# Patient Record
Sex: FEMALE | Race: BLACK OR AFRICAN AMERICAN | Employment: FULL TIME | URBAN - METROPOLITAN AREA
[De-identification: names, ages, dates, MRNs, and addresses within clinical notes are randomized per-mention and may not be internally consistent; named-entity substitution may affect disease eponyms.]

---

## 2018-04-15 ENCOUNTER — HOSPITAL ENCOUNTER (EMERGENCY)
Age: 38
Discharge: HOME | End: 2018-04-15
Payer: COMMERCIAL

## 2018-04-15 VITALS
SYSTOLIC BLOOD PRESSURE: 115 MMHG | HEART RATE: 84 BPM | BODY MASS INDEX: 30.24 KG/M2 | OXYGEN SATURATION: 100 % | HEIGHT: 59 IN | WEIGHT: 150 LBS | DIASTOLIC BLOOD PRESSURE: 72 MMHG

## 2018-04-15 DIAGNOSIS — S00.511A LIP ABRASION, INITIAL ENCOUNTER: Primary | ICD-10-CM

## 2018-04-15 DIAGNOSIS — S01.512A LACERATION OF ORAL CAVITY, INITIAL ENCOUNTER: ICD-10-CM

## 2018-04-15 PROCEDURE — 99282 EMERGENCY DEPT VISIT SF MDM: CPT

## 2018-04-15 RX ORDER — IBUPROFEN 600 MG/1
600 TABLET ORAL ONCE
Status: DISCONTINUED | OUTPATIENT
Start: 2018-04-15 | End: 2018-04-16 | Stop reason: HOSPADM

## 2018-04-15 ASSESSMENT — PAIN DESCRIPTION - PAIN TYPE: TYPE: ACUTE PAIN

## 2018-04-15 ASSESSMENT — PAIN DESCRIPTION - LOCATION: LOCATION: MOUTH

## 2018-04-15 ASSESSMENT — PAIN SCALES - GENERAL: PAINLEVEL_OUTOF10: 10

## 2018-07-02 ENCOUNTER — APPOINTMENT (OUTPATIENT)
Dept: CT IMAGING | Age: 38
End: 2018-07-02
Payer: COMMERCIAL

## 2018-07-02 ENCOUNTER — HOSPITAL ENCOUNTER (EMERGENCY)
Age: 38
Discharge: HOME | End: 2018-07-02
Payer: COMMERCIAL

## 2018-07-02 VITALS
BODY MASS INDEX: 30.24 KG/M2 | DIASTOLIC BLOOD PRESSURE: 86 MMHG | TEMPERATURE: 97.5 F | OXYGEN SATURATION: 99 % | HEART RATE: 74 BPM | HEIGHT: 59 IN | RESPIRATION RATE: 14 BRPM | WEIGHT: 150 LBS | SYSTOLIC BLOOD PRESSURE: 122 MMHG
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
## 2018-07-02 DIAGNOSIS — S16.1XXA STRAIN OF NECK MUSCLE, INITIAL ENCOUNTER: Primary | ICD-10-CM

## 2018-07-02 PROCEDURE — 99284 EMERGENCY DEPT VISIT MOD MDM: CPT

## 2018-07-02 PROCEDURE — 6370000000 HC RX 637 (ALT 250 FOR IP): Performed by: NURSE PRACTITIONER

## 2018-07-02 PROCEDURE — 72125 CT NECK SPINE W/O DYE: CPT

## 2018-07-02 RX ORDER — IBUPROFEN 800 MG/1
800 TABLET ORAL EVERY 6 HOURS PRN
Qty: 21 TABLET | Refills: 0 | Status: SHIPPED | OUTPATIENT
Start: 2018-07-02 | End: 2018-11-19 | Stop reason: ALTCHOICE

## 2018-07-02 RX ORDER — IBUPROFEN 800 MG/1
800 TABLET ORAL ONCE
Status: COMPLETED | OUTPATIENT
Start: 2018-07-02 | End: 2018-07-02

## 2018-07-02 RX ORDER — METHOCARBAMOL 500 MG/1
500 TABLET, FILM COATED ORAL 4 TIMES DAILY
Qty: 20 TABLET | Refills: 0 | Status: SHIPPED | OUTPATIENT
Start: 2018-07-02 | End: 2018-07-07

## 2018-07-02 RX ADMIN — IBUPROFEN 800 MG: 800 TABLET ORAL at 13:25

## 2018-07-02 ASSESSMENT — PAIN DESCRIPTION - PAIN TYPE: TYPE: ACUTE PAIN

## 2018-07-02 ASSESSMENT — PAIN SCALES - GENERAL
PAINLEVEL_OUTOF10: 9
PAINLEVEL_OUTOF10: 9

## 2018-07-02 ASSESSMENT — PAIN DESCRIPTION - FREQUENCY: FREQUENCY: CONTINUOUS

## 2018-07-02 ASSESSMENT — PAIN DESCRIPTION - ORIENTATION: ORIENTATION: POSTERIOR

## 2018-07-02 ASSESSMENT — PAIN DESCRIPTION - LOCATION: LOCATION: NECK

## 2018-07-02 ASSESSMENT — PAIN DESCRIPTION - DESCRIPTORS: DESCRIPTORS: ACHING;BURNING
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
## 2018-11-19 ENCOUNTER — HOSPITAL ENCOUNTER (EMERGENCY)
Age: 38
Discharge: HOME | End: 2018-11-19
Attending: EMERGENCY MEDICINE
Payer: COMMERCIAL

## 2018-11-19 VITALS
WEIGHT: 154 LBS | SYSTOLIC BLOOD PRESSURE: 116 MMHG | TEMPERATURE: 97.8 F | HEART RATE: 79 BPM | RESPIRATION RATE: 20 BRPM | DIASTOLIC BLOOD PRESSURE: 78 MMHG | BODY MASS INDEX: 31.04 KG/M2 | HEIGHT: 59 IN | OXYGEN SATURATION: 100 %

## 2018-11-19 DIAGNOSIS — N89.8 VAGINAL DISCHARGE: Primary | ICD-10-CM

## 2018-11-19 LAB
BILIRUBIN URINE: NEGATIVE
BLOOD, URINE: NEGATIVE
CLARITY: CLEAR
CLUE CELLS: NORMAL
COLOR: YELLOW
GLUCOSE URINE: NEGATIVE MG/DL
HCG(URINE) PREGNANCY TEST: NEGATIVE
KETONES, URINE: NEGATIVE MG/DL
LEUKOCYTE ESTERASE, URINE: NEGATIVE
NITRITE, URINE: NEGATIVE
PH UA: 5.5 (ref 5–9)
PROTEIN UA: NEGATIVE MG/DL
SOURCE WET PREP: NORMAL
SPECIFIC GRAVITY UA: 1.02 (ref 1–1.03)
TRICHOMONAS PREP: NORMAL
UROBILINOGEN, URINE: 0.2 E.U./DL
YEAST WET PREP: NORMAL

## 2018-11-19 PROCEDURE — 81025 URINE PREGNANCY TEST: CPT

## 2018-11-19 PROCEDURE — 6360000002 HC RX W HCPCS: Performed by: EMERGENCY MEDICINE

## 2018-11-19 PROCEDURE — 6370000000 HC RX 637 (ALT 250 FOR IP): Performed by: EMERGENCY MEDICINE

## 2018-11-19 PROCEDURE — 81003 URINALYSIS AUTO W/O SCOPE: CPT

## 2018-11-19 PROCEDURE — 96372 THER/PROPH/DIAG INJ SC/IM: CPT

## 2018-11-19 PROCEDURE — 87591 N.GONORRHOEAE DNA AMP PROB: CPT

## 2018-11-19 PROCEDURE — 87491 CHLMYD TRACH DNA AMP PROBE: CPT

## 2018-11-19 PROCEDURE — 87210 SMEAR WET MOUNT SALINE/INK: CPT

## 2018-11-19 PROCEDURE — 99283 EMERGENCY DEPT VISIT LOW MDM: CPT

## 2018-11-19 PROCEDURE — 2500000003 HC RX 250 WO HCPCS

## 2018-11-19 RX ORDER — AMOXICILLIN 500 MG/1
500 CAPSULE ORAL 3 TIMES DAILY
COMMUNITY
End: 2019-01-22

## 2018-11-19 RX ORDER — AZITHROMYCIN 250 MG/1
1000 TABLET, FILM COATED ORAL ONCE
Status: COMPLETED | OUTPATIENT
Start: 2018-11-19 | End: 2018-11-19

## 2018-11-19 RX ORDER — LIDOCAINE HYDROCHLORIDE 10 MG/ML
INJECTION, SOLUTION EPIDURAL; INFILTRATION; INTRACAUDAL; PERINEURAL
Status: COMPLETED
Start: 2018-11-19 | End: 2018-11-19

## 2018-11-19 RX ORDER — CEFTRIAXONE SODIUM 250 MG/1
250 INJECTION, POWDER, FOR SOLUTION INTRAMUSCULAR; INTRAVENOUS ONCE
Status: COMPLETED | OUTPATIENT
Start: 2018-11-19 | End: 2018-11-19

## 2018-11-19 RX ADMIN — CEFTRIAXONE SODIUM 250 MG: 250 INJECTION, POWDER, FOR SOLUTION INTRAMUSCULAR; INTRAVENOUS at 12:06

## 2018-11-19 RX ADMIN — AZITHROMYCIN 1000 MG: 250 TABLET, FILM COATED ORAL at 12:06

## 2018-11-19 RX ADMIN — LIDOCAINE HYDROCHLORIDE 5 ML: 10 INJECTION, SOLUTION EPIDURAL; INFILTRATION; INTRACAUDAL; PERINEURAL at 12:06

## 2018-11-19 NOTE — ED NOTES
Patient given discharge paperwork at this time. Patient also given scripts. Patient has no further questions at this time. Nurse provided education to patient. Patient is alert and oriented and VS are stable at this time.         Elizabeth Clement RN  11/19/18 1428

## 2018-11-19 NOTE — ED PROVIDER NOTES
Negative    pH, UA 5.5 5.0 - 9.0    Protein, UA Negative Negative mg/dL    Urobilinogen, Urine 0.2 <2.0 E.U./dL    Nitrite, Urine Negative Negative    Leukocyte Esterase, Urine Negative Negative     No orders to display           All above test results were reviewed in details. ------------------------- NURSING NOTES AND VITALS REVIEWED ---------------------------   The nursing notes within the ED encounter and vital signs as below have been reviewed. /64   Pulse 66   Temp 97.8 °F (36.6 °C) (Oral)   Resp 16   Ht 4' 11\" (1.499 m)   Wt 154 lb (69.9 kg)   LMP 11/12/2018   SpO2 100%   BMI 31.10 kg/m²   Oxygen Saturation Interpretation: Normal      ------------------------------------------ PROGRESS NOTES ------------------------------------------       I have spoken with the patient and discussed todays results, in addition to providing specific details for the plan of care and counseling regarding the diagnosis and prognosis. Their questions are answered at this time and they are agreeable with the plan. Medical Decision Making Rationale: This patient presented emergency room with vaginal discharge and was foul-smelling. The patient was placed on Flagyl and amoxicillin without improvement of symptoms. No cultures were obtained initially. Pelvic exam was negative for cervical motion tenderness. --------------------------------- ADDITIONAL PROVIDER NOTES ---------------------------------       This patient is stable for discharge. I have shared the specific conditions for return, as well as the importance of follow-up. Clinical Impression:  1.  Vaginal discharge            Michele Britt MD  11/19/18 5929

## 2018-11-23 LAB
CHLAMYDIA TRACHOMATIS AMPLIFIED DET: NORMAL
N GONORRHOEAE AMPLIFIED DET: NORMAL

## 2019-01-22 ENCOUNTER — HOSPITAL ENCOUNTER (EMERGENCY)
Age: 39
Discharge: HOME | End: 2019-01-22
Attending: EMERGENCY MEDICINE
Payer: COMMERCIAL

## 2019-01-22 ENCOUNTER — APPOINTMENT (OUTPATIENT)
Dept: CT IMAGING | Age: 39
End: 2019-01-22
Payer: COMMERCIAL

## 2019-01-22 VITALS
DIASTOLIC BLOOD PRESSURE: 64 MMHG | TEMPERATURE: 98 F | RESPIRATION RATE: 18 BRPM | SYSTOLIC BLOOD PRESSURE: 105 MMHG | OXYGEN SATURATION: 98 % | HEART RATE: 70 BPM | WEIGHT: 154 LBS | BODY MASS INDEX: 31.04 KG/M2 | HEIGHT: 59 IN

## 2019-01-22 DIAGNOSIS — K59.00 CONSTIPATION, UNSPECIFIED CONSTIPATION TYPE: ICD-10-CM

## 2019-01-22 DIAGNOSIS — R10.9 LEFT FLANK PAIN: Primary | ICD-10-CM

## 2019-01-22 LAB
ALBUMIN SERPL-MCNC: 3.9 G/DL (ref 3.5–5.2)
ALP BLD-CCNC: 54 U/L (ref 35–104)
ALT SERPL-CCNC: 17 U/L (ref 0–32)
ANION GAP SERPL CALCULATED.3IONS-SCNC: 10 MMOL/L (ref 7–16)
AST SERPL-CCNC: 28 U/L (ref 0–31)
BASOPHILS ABSOLUTE: 0.02 E9/L (ref 0–0.2)
BASOPHILS RELATIVE PERCENT: 0.2 % (ref 0–2)
BILIRUB SERPL-MCNC: <0.2 MG/DL (ref 0–1.2)
BILIRUBIN URINE: NEGATIVE
BLOOD, URINE: NEGATIVE
BUN BLDV-MCNC: 16 MG/DL (ref 6–20)
CALCIUM SERPL-MCNC: 8.8 MG/DL (ref 8.6–10.2)
CHLORIDE BLD-SCNC: 103 MMOL/L (ref 98–107)
CLARITY: CLEAR
CO2: 21 MMOL/L (ref 22–29)
COLOR: YELLOW
CREAT SERPL-MCNC: 0.9 MG/DL (ref 0.5–1)
EOSINOPHILS ABSOLUTE: 0.03 E9/L (ref 0.05–0.5)
EOSINOPHILS RELATIVE PERCENT: 0.3 % (ref 0–6)
GFR AFRICAN AMERICAN: >60
GFR NON-AFRICAN AMERICAN: >60 ML/MIN/1.73
GLUCOSE BLD-MCNC: 85 MG/DL (ref 74–99)
GLUCOSE URINE: NEGATIVE MG/DL
HCG, URINE, POC: NEGATIVE
HCT VFR BLD CALC: 35.2 % (ref 34–48)
HEMOGLOBIN: 11.9 G/DL (ref 11.5–15.5)
IMMATURE GRANULOCYTES #: 0.04 E9/L
IMMATURE GRANULOCYTES %: 0.4 % (ref 0–5)
KETONES, URINE: ABNORMAL MG/DL
LACTIC ACID: 0.8 MMOL/L (ref 0.5–2.2)
LEUKOCYTE ESTERASE, URINE: NEGATIVE
LIPASE: 48 U/L (ref 13–60)
LYMPHOCYTES ABSOLUTE: 3.57 E9/L (ref 1.5–4)
LYMPHOCYTES RELATIVE PERCENT: 37.8 % (ref 20–42)
Lab: NORMAL
MCH RBC QN AUTO: 30.7 PG (ref 26–35)
MCHC RBC AUTO-ENTMCNC: 33.8 % (ref 32–34.5)
MCV RBC AUTO: 90.7 FL (ref 80–99.9)
MONOCYTES ABSOLUTE: 0.66 E9/L (ref 0.1–0.95)
MONOCYTES RELATIVE PERCENT: 7 % (ref 2–12)
NEGATIVE QC PASS/FAIL: NORMAL
NEUTROPHILS ABSOLUTE: 5.12 E9/L (ref 1.8–7.3)
NEUTROPHILS RELATIVE PERCENT: 54.3 % (ref 43–80)
NITRITE, URINE: NEGATIVE
PDW BLD-RTO: 12.8 FL (ref 11.5–15)
PH UA: 6 (ref 5–9)
PLATELET # BLD: 239 E9/L (ref 130–450)
PMV BLD AUTO: 10.7 FL (ref 7–12)
POSITIVE QC PASS/FAIL: NORMAL
POTASSIUM SERPL-SCNC: 4.5 MMOL/L (ref 3.5–5)
PROTEIN UA: NEGATIVE MG/DL
RBC # BLD: 3.88 E12/L (ref 3.5–5.5)
SODIUM BLD-SCNC: 134 MMOL/L (ref 132–146)
SPECIFIC GRAVITY UA: 1.02 (ref 1–1.03)
TOTAL PROTEIN: 7.7 G/DL (ref 6.4–8.3)
UROBILINOGEN, URINE: 0.2 E.U./DL
WBC # BLD: 9.4 E9/L (ref 4.5–11.5)

## 2019-01-22 PROCEDURE — 2580000003 HC RX 258: Performed by: RADIOLOGY

## 2019-01-22 PROCEDURE — 99284 EMERGENCY DEPT VISIT MOD MDM: CPT

## 2019-01-22 PROCEDURE — 81003 URINALYSIS AUTO W/O SCOPE: CPT

## 2019-01-22 PROCEDURE — 96374 THER/PROPH/DIAG INJ IV PUSH: CPT

## 2019-01-22 PROCEDURE — 80053 COMPREHEN METABOLIC PANEL: CPT

## 2019-01-22 PROCEDURE — 85025 COMPLETE CBC W/AUTO DIFF WBC: CPT

## 2019-01-22 PROCEDURE — 83690 ASSAY OF LIPASE: CPT

## 2019-01-22 PROCEDURE — 83605 ASSAY OF LACTIC ACID: CPT

## 2019-01-22 PROCEDURE — 2580000003 HC RX 258: Performed by: EMERGENCY MEDICINE

## 2019-01-22 PROCEDURE — 6360000002 HC RX W HCPCS: Performed by: EMERGENCY MEDICINE

## 2019-01-22 PROCEDURE — 96375 TX/PRO/DX INJ NEW DRUG ADDON: CPT

## 2019-01-22 PROCEDURE — 74177 CT ABD & PELVIS W/CONTRAST: CPT

## 2019-01-22 PROCEDURE — 6360000004 HC RX CONTRAST MEDICATION: Performed by: RADIOLOGY

## 2019-01-22 RX ORDER — DOCUSATE SODIUM 100 MG/1
100 CAPSULE, LIQUID FILLED ORAL 2 TIMES DAILY
Qty: 28 CAPSULE | Refills: 0 | Status: SHIPPED | OUTPATIENT
Start: 2019-01-22 | End: 2019-02-05

## 2019-01-22 RX ORDER — SODIUM CHLORIDE 0.9 % (FLUSH) 0.9 %
10 SYRINGE (ML) INJECTION 2 TIMES DAILY
Status: DISCONTINUED | OUTPATIENT
Start: 2019-01-22 | End: 2019-01-22

## 2019-01-22 RX ORDER — ONDANSETRON 2 MG/ML
4 INJECTION INTRAMUSCULAR; INTRAVENOUS ONCE
Status: COMPLETED | OUTPATIENT
Start: 2019-01-22 | End: 2019-01-22

## 2019-01-22 RX ORDER — KETOROLAC TROMETHAMINE 30 MG/ML
15 INJECTION, SOLUTION INTRAMUSCULAR; INTRAVENOUS ONCE
Status: COMPLETED | OUTPATIENT
Start: 2019-01-22 | End: 2019-01-22

## 2019-01-22 RX ORDER — POLYETHYLENE GLYCOL 3350 17 G/17G
POWDER, FOR SOLUTION ORAL
Qty: 1 BOTTLE | Refills: 0 | Status: SHIPPED | OUTPATIENT
Start: 2019-01-22 | End: 2019-02-05

## 2019-01-22 RX ORDER — 0.9 % SODIUM CHLORIDE 0.9 %
1000 INTRAVENOUS SOLUTION INTRAVENOUS ONCE
Status: COMPLETED | OUTPATIENT
Start: 2019-01-22 | End: 2019-01-22

## 2019-01-22 RX ORDER — SODIUM CHLORIDE 0.9 % (FLUSH) 0.9 %
10 SYRINGE (ML) INJECTION 2 TIMES DAILY
Status: DISCONTINUED | OUTPATIENT
Start: 2019-01-22 | End: 2019-01-22 | Stop reason: HOSPADM

## 2019-01-22 RX ADMIN — IOPAMIDOL 110 ML: 755 INJECTION, SOLUTION INTRAVENOUS at 05:56

## 2019-01-22 RX ADMIN — Medication 10 ML: at 05:52

## 2019-01-22 RX ADMIN — ONDANSETRON 4 MG: 2 INJECTION INTRAMUSCULAR; INTRAVENOUS at 04:36

## 2019-01-22 RX ADMIN — KETOROLAC TROMETHAMINE 15 MG: 30 INJECTION, SOLUTION INTRAMUSCULAR; INTRAVENOUS at 04:36

## 2019-01-22 RX ADMIN — SODIUM CHLORIDE 1000 ML: 9 INJECTION, SOLUTION INTRAVENOUS at 04:36

## 2019-01-22 ASSESSMENT — PAIN DESCRIPTION - ORIENTATION: ORIENTATION: LEFT

## 2019-01-22 ASSESSMENT — PAIN SCALES - GENERAL
PAINLEVEL_OUTOF10: 5
PAINLEVEL_OUTOF10: 8
PAINLEVEL_OUTOF10: 8
PAINLEVEL_OUTOF10: 6

## 2019-01-22 ASSESSMENT — PAIN DESCRIPTION - PAIN TYPE: TYPE: ACUTE PAIN

## 2019-01-22 ASSESSMENT — PAIN DESCRIPTION - LOCATION
LOCATION: ABDOMEN
LOCATION: ABDOMEN

## 2019-03-07 PROBLEM — B20 HIV (HUMAN IMMUNODEFICIENCY VIRUS INFECTION) (HCC): Status: ACTIVE | Noted: 2019-03-07

## 2019-03-07 PROBLEM — Z21 HIV (HUMAN IMMUNODEFICIENCY VIRUS INFECTION) (HCC): Status: ACTIVE | Noted: 2019-03-07

## 2019-05-22 ENCOUNTER — HOSPITAL ENCOUNTER (EMERGENCY)
Age: 39
Discharge: HOME | End: 2019-05-22
Payer: COMMERCIAL

## 2019-05-22 ENCOUNTER — APPOINTMENT (OUTPATIENT)
Dept: GENERAL RADIOLOGY | Age: 39
End: 2019-05-22
Payer: COMMERCIAL

## 2019-05-22 ENCOUNTER — APPOINTMENT (OUTPATIENT)
Dept: ULTRASOUND IMAGING | Age: 39
End: 2019-05-22
Payer: COMMERCIAL

## 2019-05-22 VITALS
HEART RATE: 60 BPM | HEIGHT: 59 IN | TEMPERATURE: 98.4 F | SYSTOLIC BLOOD PRESSURE: 101 MMHG | DIASTOLIC BLOOD PRESSURE: 63 MMHG | BODY MASS INDEX: 32.25 KG/M2 | OXYGEN SATURATION: 98 % | RESPIRATION RATE: 16 BRPM | WEIGHT: 160 LBS

## 2019-05-22 DIAGNOSIS — M25.551 PAIN IN JOINT INVOLVING RIGHT PELVIC REGION AND THIGH: ICD-10-CM

## 2019-05-22 DIAGNOSIS — N83.202 CYST OF LEFT OVARY: ICD-10-CM

## 2019-05-22 DIAGNOSIS — M25.551 RIGHT HIP PAIN: Primary | ICD-10-CM

## 2019-05-22 LAB
BACTERIA: ABNORMAL /HPF
BILIRUBIN URINE: NEGATIVE
BLOOD, URINE: NEGATIVE
CLARITY: NORMAL
COLOR: YELLOW
EPITHELIAL CELLS, UA: ABNORMAL /HPF
GLUCOSE URINE: NEGATIVE MG/DL
HCG, URINE, POC: NEGATIVE
KETONES, URINE: NEGATIVE MG/DL
LEUKOCYTE ESTERASE, URINE: NEGATIVE
Lab: NORMAL
NEGATIVE QC PASS/FAIL: NORMAL
NITRITE, URINE: NEGATIVE
PH UA: 6.5 (ref 5–9)
POSITIVE QC PASS/FAIL: NORMAL
PROTEIN UA: NEGATIVE MG/DL
RBC UA: ABNORMAL /HPF (ref 0–2)
SPECIFIC GRAVITY UA: 1.02 (ref 1–1.03)
UROBILINOGEN, URINE: 0.2 E.U./DL
WBC UA: ABNORMAL /HPF (ref 0–5)

## 2019-05-22 PROCEDURE — 76830 TRANSVAGINAL US NON-OB: CPT

## 2019-05-22 PROCEDURE — 81001 URINALYSIS AUTO W/SCOPE: CPT

## 2019-05-22 PROCEDURE — 99284 EMERGENCY DEPT VISIT MOD MDM: CPT

## 2019-05-22 PROCEDURE — 73502 X-RAY EXAM HIP UNI 2-3 VIEWS: CPT

## 2019-05-22 RX ORDER — OXYCODONE HYDROCHLORIDE AND ACETAMINOPHEN 5; 325 MG/1; MG/1
1 TABLET ORAL EVERY 6 HOURS PRN
Qty: 10 TABLET | Refills: 0 | Status: SHIPPED | OUTPATIENT
Start: 2019-05-22 | End: 2019-05-25

## 2019-05-22 RX ORDER — CYCLOBENZAPRINE HCL 5 MG
5 TABLET ORAL 2 TIMES DAILY PRN
Qty: 15 TABLET | Refills: 0 | Status: SHIPPED | OUTPATIENT
Start: 2019-05-22 | End: 2019-06-01

## 2019-05-22 RX ORDER — NAPROXEN 500 MG/1
500 TABLET ORAL 2 TIMES DAILY
Qty: 14 TABLET | Refills: 0 | Status: SHIPPED | OUTPATIENT
Start: 2019-05-22 | End: 2019-05-29

## 2019-05-22 ASSESSMENT — PAIN SCALES - GENERAL: PAINLEVEL_OUTOF10: 8

## 2019-05-22 ASSESSMENT — PAIN DESCRIPTION - LOCATION: LOCATION: HIP;GROIN

## 2019-05-22 ASSESSMENT — PAIN DESCRIPTION - PAIN TYPE: TYPE: ACUTE PAIN

## 2019-05-22 ASSESSMENT — PAIN DESCRIPTION - ORIENTATION: ORIENTATION: RIGHT

## 2019-05-22 ASSESSMENT — PAIN DESCRIPTION - DESCRIPTORS: DESCRIPTORS: ACHING

## 2019-05-22 ASSESSMENT — PAIN DESCRIPTION - FREQUENCY: FREQUENCY: CONTINUOUS
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
## 2019-09-30 ENCOUNTER — APPOINTMENT (OUTPATIENT)
Dept: GENERAL RADIOLOGY | Age: 39
End: 2019-09-30
Payer: COMMERCIAL

## 2019-09-30 ENCOUNTER — HOSPITAL ENCOUNTER (EMERGENCY)
Age: 39
Discharge: HOME | End: 2019-09-30
Payer: COMMERCIAL

## 2019-09-30 VITALS
WEIGHT: 150 LBS | HEIGHT: 59 IN | OXYGEN SATURATION: 99 % | DIASTOLIC BLOOD PRESSURE: 66 MMHG | RESPIRATION RATE: 16 BRPM | HEART RATE: 76 BPM | SYSTOLIC BLOOD PRESSURE: 110 MMHG | TEMPERATURE: 98 F | BODY MASS INDEX: 30.24 KG/M2

## 2019-09-30 DIAGNOSIS — R05.9 COUGH: Primary | ICD-10-CM

## 2019-09-30 LAB
ALBUMIN SERPL-MCNC: 4.1 G/DL (ref 3.5–5.2)
ALP BLD-CCNC: 60 U/L (ref 35–104)
ALT SERPL-CCNC: 7 U/L (ref 0–32)
ANION GAP SERPL CALCULATED.3IONS-SCNC: 13 MMOL/L (ref 7–16)
AST SERPL-CCNC: 23 U/L (ref 0–31)
BASOPHILS ABSOLUTE: 0.02 E9/L (ref 0–0.2)
BASOPHILS RELATIVE PERCENT: 0.3 % (ref 0–2)
BILIRUB SERPL-MCNC: <0.2 MG/DL (ref 0–1.2)
BUN BLDV-MCNC: 12 MG/DL (ref 6–20)
CALCIUM SERPL-MCNC: 9.3 MG/DL (ref 8.6–10.2)
CHLORIDE BLD-SCNC: 106 MMOL/L (ref 98–107)
CO2: 23 MMOL/L (ref 22–29)
CREAT SERPL-MCNC: 1 MG/DL (ref 0.5–1)
EOSINOPHILS ABSOLUTE: 0.02 E9/L (ref 0.05–0.5)
EOSINOPHILS RELATIVE PERCENT: 0.3 % (ref 0–6)
GFR AFRICAN AMERICAN: >60
GFR NON-AFRICAN AMERICAN: >60 ML/MIN/1.73
GLUCOSE BLD-MCNC: 76 MG/DL (ref 74–99)
HCT VFR BLD CALC: 35.3 % (ref 34–48)
HEMOGLOBIN: 12 G/DL (ref 11.5–15.5)
IMMATURE GRANULOCYTES #: 0.01 E9/L
IMMATURE GRANULOCYTES %: 0.2 % (ref 0–5)
INFLUENZA A BY PCR: NOT DETECTED
INFLUENZA B BY PCR: NOT DETECTED
LYMPHOCYTES ABSOLUTE: 2.54 E9/L (ref 1.5–4)
LYMPHOCYTES RELATIVE PERCENT: 43.6 % (ref 20–42)
MCH RBC QN AUTO: 30.6 PG (ref 26–35)
MCHC RBC AUTO-ENTMCNC: 34 % (ref 32–34.5)
MCV RBC AUTO: 90.1 FL (ref 80–99.9)
MONOCYTES ABSOLUTE: 0.54 E9/L (ref 0.1–0.95)
MONOCYTES RELATIVE PERCENT: 9.3 % (ref 2–12)
NEUTROPHILS ABSOLUTE: 2.7 E9/L (ref 1.8–7.3)
NEUTROPHILS RELATIVE PERCENT: 46.3 % (ref 43–80)
PDW BLD-RTO: 12.9 FL (ref 11.5–15)
PLATELET # BLD: 260 E9/L (ref 130–450)
PMV BLD AUTO: 10.2 FL (ref 7–12)
POTASSIUM REFLEX MAGNESIUM: 3.6 MMOL/L (ref 3.5–5)
RBC # BLD: 3.92 E12/L (ref 3.5–5.5)
SODIUM BLD-SCNC: 142 MMOL/L (ref 132–146)
TOTAL PROTEIN: 7.4 G/DL (ref 6.4–8.3)
WBC # BLD: 5.8 E9/L (ref 4.5–11.5)

## 2019-09-30 PROCEDURE — 85025 COMPLETE CBC W/AUTO DIFF WBC: CPT

## 2019-09-30 PROCEDURE — 80053 COMPREHEN METABOLIC PANEL: CPT

## 2019-09-30 PROCEDURE — 87502 INFLUENZA DNA AMP PROBE: CPT

## 2019-09-30 PROCEDURE — 36415 COLL VENOUS BLD VENIPUNCTURE: CPT

## 2019-09-30 PROCEDURE — 6370000000 HC RX 637 (ALT 250 FOR IP): Performed by: NURSE PRACTITIONER

## 2019-09-30 PROCEDURE — 71046 X-RAY EXAM CHEST 2 VIEWS: CPT

## 2019-09-30 PROCEDURE — 99283 EMERGENCY DEPT VISIT LOW MDM: CPT

## 2019-09-30 RX ORDER — IPRATROPIUM BROMIDE AND ALBUTEROL SULFATE 2.5; .5 MG/3ML; MG/3ML
1 SOLUTION RESPIRATORY (INHALATION) ONCE
Status: COMPLETED | OUTPATIENT
Start: 2019-09-30 | End: 2019-09-30

## 2019-09-30 RX ORDER — LORATADINE 10 MG/1
10 TABLET ORAL DAILY
Qty: 30 TABLET | Refills: 0 | Status: SHIPPED | OUTPATIENT
Start: 2019-09-30 | End: 2019-10-30

## 2019-09-30 RX ORDER — GUAIFENESIN AND DEXTROMETHORPHAN HYDROBROMIDE 1200; 60 MG/1; MG/1
1 TABLET, EXTENDED RELEASE ORAL 2 TIMES DAILY
Qty: 20 TABLET | Refills: 0 | Status: SHIPPED | OUTPATIENT
Start: 2019-09-30 | End: 2019-10-10

## 2019-09-30 RX ORDER — PREDNISONE 20 MG/1
60 TABLET ORAL ONCE
Status: COMPLETED | OUTPATIENT
Start: 2019-09-30 | End: 2019-09-30

## 2019-09-30 RX ORDER — FLUTICASONE PROPIONATE 50 MCG
1 SPRAY, SUSPENSION (ML) NASAL DAILY
Qty: 1 BOTTLE | Refills: 0 | Status: SHIPPED | OUTPATIENT
Start: 2019-09-30 | End: 2019-11-16 | Stop reason: ALTCHOICE

## 2019-09-30 RX ADMIN — IPRATROPIUM BROMIDE AND ALBUTEROL SULFATE 1 AMPULE: .5; 3 SOLUTION RESPIRATORY (INHALATION) at 15:53

## 2019-09-30 RX ADMIN — PREDNISONE 60 MG: 20 TABLET ORAL at 15:53

## 2019-10-30 ENCOUNTER — TELEPHONE (OUTPATIENT)
Dept: ENT CLINIC | Age: 39
End: 2019-10-30

## 2019-11-16 ENCOUNTER — APPOINTMENT (OUTPATIENT)
Dept: GENERAL RADIOLOGY | Age: 39
End: 2019-11-16
Payer: COMMERCIAL

## 2019-11-16 ENCOUNTER — HOSPITAL ENCOUNTER (EMERGENCY)
Age: 39
Discharge: HOME | End: 2019-11-17
Attending: EMERGENCY MEDICINE
Payer: COMMERCIAL

## 2019-11-16 DIAGNOSIS — J20.8 ACUTE BRONCHITIS DUE TO OTHER SPECIFIED ORGANISMS: Primary | ICD-10-CM

## 2019-11-16 DIAGNOSIS — R04.2 COUGH WITH HEMOPTYSIS: ICD-10-CM

## 2019-11-16 PROCEDURE — 71046 X-RAY EXAM CHEST 2 VIEWS: CPT

## 2019-11-16 PROCEDURE — 99283 EMERGENCY DEPT VISIT LOW MDM: CPT

## 2019-11-16 PROCEDURE — 6360000002 HC RX W HCPCS: Performed by: EMERGENCY MEDICINE

## 2019-11-16 PROCEDURE — 96372 THER/PROPH/DIAG INJ SC/IM: CPT

## 2019-11-16 PROCEDURE — 6370000000 HC RX 637 (ALT 250 FOR IP): Performed by: EMERGENCY MEDICINE

## 2019-11-16 PROCEDURE — 2580000003 HC RX 258

## 2019-11-16 RX ORDER — SULFAMETHOXAZOLE AND TRIMETHOPRIM 800; 160 MG/1; MG/1
1 TABLET ORAL 2 TIMES DAILY
COMMUNITY
End: 2019-12-08

## 2019-11-16 RX ORDER — GUAIFENESIN/DEXTROMETHORPHAN 100-10MG/5
10 SYRUP ORAL ONCE
Status: COMPLETED | OUTPATIENT
Start: 2019-11-16 | End: 2019-11-16

## 2019-11-16 RX ORDER — DOXYCYCLINE HYCLATE 100 MG
100 TABLET ORAL 2 TIMES DAILY
Qty: 20 TABLET | Refills: 0 | Status: SHIPPED | OUTPATIENT
Start: 2019-11-16 | End: 2019-11-26

## 2019-11-16 RX ORDER — PREDNISONE 20 MG/1
60 TABLET ORAL ONCE
Status: COMPLETED | OUTPATIENT
Start: 2019-11-16 | End: 2019-11-16

## 2019-11-16 RX ORDER — CEFTRIAXONE 1 G/1
1 INJECTION, POWDER, FOR SOLUTION INTRAMUSCULAR; INTRAVENOUS ONCE
Status: COMPLETED | OUTPATIENT
Start: 2019-11-16 | End: 2019-11-16

## 2019-11-16 RX ORDER — LORATADINE AND PSEUDOEPHEDRINE SULFATE 5; 120 MG/1; MG/1
1 TABLET, EXTENDED RELEASE ORAL 2 TIMES DAILY
Qty: 20 TABLET | Refills: 0 | Status: SHIPPED | OUTPATIENT
Start: 2019-11-16 | End: 2019-12-08

## 2019-11-16 RX ORDER — IPRATROPIUM BROMIDE AND ALBUTEROL SULFATE 2.5; .5 MG/3ML; MG/3ML
1 SOLUTION RESPIRATORY (INHALATION) ONCE
Status: COMPLETED | OUTPATIENT
Start: 2019-11-16 | End: 2019-11-16

## 2019-11-16 RX ORDER — BENZONATATE 100 MG/1
100 CAPSULE ORAL 3 TIMES DAILY PRN
Qty: 21 CAPSULE | Refills: 0 | Status: SHIPPED | OUTPATIENT
Start: 2019-11-16 | End: 2019-11-23

## 2019-11-16 RX ORDER — TRIAMCINOLONE ACETONIDE 40 MG/ML
40 INJECTION, SUSPENSION INTRA-ARTICULAR; INTRAMUSCULAR ONCE
Status: DISCONTINUED | OUTPATIENT
Start: 2019-11-16 | End: 2019-11-16

## 2019-11-16 RX ORDER — ALBUTEROL SULFATE 90 UG/1
2 AEROSOL, METERED RESPIRATORY (INHALATION) EVERY 4 HOURS PRN
Qty: 1 INHALER | Status: SHIPPED | OUTPATIENT
Start: 2019-11-16 | End: 2019-12-08

## 2019-11-16 RX ADMIN — IPRATROPIUM BROMIDE AND ALBUTEROL SULFATE 1 AMPULE: .5; 3 SOLUTION RESPIRATORY (INHALATION) at 23:38

## 2019-11-16 RX ADMIN — CEFTRIAXONE SODIUM 1 G: 1 INJECTION, POWDER, FOR SOLUTION INTRAMUSCULAR; INTRAVENOUS at 23:37

## 2019-11-16 RX ADMIN — GUAIFENESIN AND DEXTROMETHORPHAN 10 ML: 100; 10 SYRUP ORAL at 23:37

## 2019-11-16 RX ADMIN — PREDNISONE 60 MG: 20 TABLET ORAL at 23:37

## 2019-11-16 RX ADMIN — WATER 10 ML: 1 INJECTION INTRAMUSCULAR; INTRAVENOUS; SUBCUTANEOUS at 23:37

## 2019-11-16 ASSESSMENT — PAIN SCALES - GENERAL: PAINLEVEL_OUTOF10: 8

## 2019-11-16 ASSESSMENT — PAIN DESCRIPTION - ORIENTATION: ORIENTATION: UPPER

## 2019-11-16 ASSESSMENT — PAIN DESCRIPTION - FREQUENCY: FREQUENCY: INTERMITTENT

## 2019-11-16 ASSESSMENT — PAIN DESCRIPTION - LOCATION: LOCATION: RIB CAGE

## 2019-11-17 VITALS
WEIGHT: 160 LBS | HEART RATE: 72 BPM | HEIGHT: 59 IN | SYSTOLIC BLOOD PRESSURE: 100 MMHG | TEMPERATURE: 98.8 F | DIASTOLIC BLOOD PRESSURE: 64 MMHG | OXYGEN SATURATION: 96 % | RESPIRATION RATE: 14 BRPM | BODY MASS INDEX: 32.25 KG/M2

## 2019-12-08 ENCOUNTER — HOSPITAL ENCOUNTER (EMERGENCY)
Age: 39
Discharge: HOME | End: 2019-12-08
Attending: EMERGENCY MEDICINE
Payer: COMMERCIAL

## 2019-12-08 VITALS
TEMPERATURE: 98.8 F | HEART RATE: 72 BPM | HEIGHT: 59 IN | RESPIRATION RATE: 16 BRPM | SYSTOLIC BLOOD PRESSURE: 112 MMHG | DIASTOLIC BLOOD PRESSURE: 90 MMHG | WEIGHT: 160 LBS | OXYGEN SATURATION: 97 % | BODY MASS INDEX: 32.25 KG/M2

## 2019-12-08 DIAGNOSIS — J04.0 LARYNGITIS: ICD-10-CM

## 2019-12-08 DIAGNOSIS — J06.9 VIRAL UPPER RESPIRATORY TRACT INFECTION: Primary | ICD-10-CM

## 2019-12-08 LAB — STREP GRP A PCR: NEGATIVE

## 2019-12-08 PROCEDURE — 99283 EMERGENCY DEPT VISIT LOW MDM: CPT

## 2019-12-08 PROCEDURE — 87880 STREP A ASSAY W/OPTIC: CPT

## 2019-12-08 RX ORDER — ALBUTEROL SULFATE 90 UG/1
2 AEROSOL, METERED RESPIRATORY (INHALATION) EVERY 6 HOURS PRN
Qty: 1 INHALER | Refills: 0 | Status: SHIPPED | OUTPATIENT
Start: 2019-12-08 | End: 2021-04-05

## 2019-12-08 RX ORDER — BENZONATATE 100 MG/1
100 CAPSULE ORAL 3 TIMES DAILY PRN
Qty: 15 CAPSULE | Refills: 0 | Status: SHIPPED | OUTPATIENT
Start: 2019-12-08 | End: 2019-12-13

## 2019-12-08 ASSESSMENT — PAIN DESCRIPTION - PROGRESSION: CLINICAL_PROGRESSION: GRADUALLY WORSENING

## 2019-12-08 ASSESSMENT — PAIN - FUNCTIONAL ASSESSMENT: PAIN_FUNCTIONAL_ASSESSMENT: PREVENTS OR INTERFERES SOME ACTIVE ACTIVITIES AND ADLS

## 2019-12-08 ASSESSMENT — PAIN DESCRIPTION - DESCRIPTORS: DESCRIPTORS: SORE

## 2019-12-08 ASSESSMENT — PAIN DESCRIPTION - LOCATION: LOCATION: THROAT

## 2019-12-08 ASSESSMENT — PAIN DESCRIPTION - ONSET: ONSET: ON-GOING

## 2019-12-08 ASSESSMENT — PAIN SCALES - GENERAL: PAINLEVEL_OUTOF10: 8

## 2019-12-08 ASSESSMENT — PAIN DESCRIPTION - PAIN TYPE: TYPE: ACUTE PAIN

## 2019-12-08 ASSESSMENT — PAIN DESCRIPTION - FREQUENCY: FREQUENCY: CONTINUOUS

## 2020-01-31 ENCOUNTER — HOSPITAL ENCOUNTER (EMERGENCY)
Age: 40
Discharge: HOME | End: 2020-01-31
Attending: EMERGENCY MEDICINE
Payer: COMMERCIAL

## 2020-01-31 ENCOUNTER — APPOINTMENT (OUTPATIENT)
Dept: GENERAL RADIOLOGY | Age: 40
End: 2020-01-31
Payer: COMMERCIAL

## 2020-01-31 ENCOUNTER — APPOINTMENT (OUTPATIENT)
Dept: ULTRASOUND IMAGING | Age: 40
End: 2020-01-31
Payer: COMMERCIAL

## 2020-01-31 VITALS
HEART RATE: 78 BPM | DIASTOLIC BLOOD PRESSURE: 78 MMHG | HEIGHT: 59 IN | TEMPERATURE: 98.4 F | WEIGHT: 155 LBS | BODY MASS INDEX: 31.25 KG/M2 | RESPIRATION RATE: 18 BRPM | OXYGEN SATURATION: 98 % | SYSTOLIC BLOOD PRESSURE: 120 MMHG

## 2020-01-31 LAB — D DIMER: >5250 NG/ML DDU

## 2020-01-31 PROCEDURE — 85378 FIBRIN DEGRADE SEMIQUANT: CPT

## 2020-01-31 PROCEDURE — 73080 X-RAY EXAM OF ELBOW: CPT

## 2020-01-31 PROCEDURE — 6360000002 HC RX W HCPCS: Performed by: EMERGENCY MEDICINE

## 2020-01-31 PROCEDURE — 99284 EMERGENCY DEPT VISIT MOD MDM: CPT

## 2020-01-31 PROCEDURE — 96372 THER/PROPH/DIAG INJ SC/IM: CPT

## 2020-01-31 PROCEDURE — 93971 EXTREMITY STUDY: CPT

## 2020-01-31 RX ORDER — IBUPROFEN 800 MG/1
800 TABLET ORAL EVERY 6 HOURS PRN
COMMUNITY
End: 2020-01-31

## 2020-01-31 RX ORDER — DEXAMETHASONE SODIUM PHOSPHATE 10 MG/ML
10 INJECTION INTRAMUSCULAR; INTRAVENOUS ONCE
Status: DISCONTINUED | OUTPATIENT
Start: 2020-01-31 | End: 2020-01-31 | Stop reason: HOSPADM

## 2020-01-31 RX ORDER — IBUPROFEN 400 MG/1
400 TABLET ORAL EVERY 6 HOURS PRN
Qty: 40 TABLET | Refills: 0 | Status: SHIPPED | OUTPATIENT
Start: 2020-01-31 | End: 2020-10-18

## 2020-01-31 RX ORDER — KETOROLAC TROMETHAMINE 30 MG/ML
15 INJECTION, SOLUTION INTRAMUSCULAR; INTRAVENOUS ONCE
Status: COMPLETED | OUTPATIENT
Start: 2020-01-31 | End: 2020-01-31

## 2020-01-31 RX ADMIN — KETOROLAC TROMETHAMINE 15 MG: 30 INJECTION, SOLUTION INTRAMUSCULAR; INTRAVENOUS at 06:51

## 2020-01-31 ASSESSMENT — PAIN DESCRIPTION - FREQUENCY: FREQUENCY: INTERMITTENT

## 2020-01-31 ASSESSMENT — PAIN DESCRIPTION - ORIENTATION: ORIENTATION: RIGHT

## 2020-01-31 ASSESSMENT — PAIN DESCRIPTION - PAIN TYPE: TYPE: ACUTE PAIN

## 2020-01-31 ASSESSMENT — PAIN DESCRIPTION - DESCRIPTORS: DESCRIPTORS: PRESSURE

## 2020-01-31 ASSESSMENT — PAIN DESCRIPTION - LOCATION: LOCATION: ARM

## 2020-01-31 ASSESSMENT — PAIN SCALES - GENERAL: PAINLEVEL_OUTOF10: 9
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
## 2020-02-01 ASSESSMENT — ENCOUNTER SYMPTOMS
BACK PAIN: 0
ABDOMINAL DISTENTION: 0
BLOOD IN STOOL: 0
EYE REDNESS: 0
EYE DISCHARGE: 0
WHEEZING: 0
VOMITING: 0
SHORTNESS OF BREATH: 0
COUGH: 0
CONSTIPATION: 0
DIARRHEA: 0
RHINORRHEA: 0
COLOR CHANGE: 0
EYE PAIN: 0
SINUS PRESSURE: 0
ABDOMINAL PAIN: 0
NAUSEA: 0
SORE THROAT: 0

## 2020-05-21 ENCOUNTER — APPOINTMENT (OUTPATIENT)
Dept: GENERAL RADIOLOGY | Age: 40
End: 2020-05-21
Payer: COMMERCIAL

## 2020-05-21 ENCOUNTER — HOSPITAL ENCOUNTER (EMERGENCY)
Age: 40
Discharge: HOME | End: 2020-05-21
Attending: FAMILY MEDICINE
Payer: COMMERCIAL

## 2020-05-21 VITALS
OXYGEN SATURATION: 98 % | SYSTOLIC BLOOD PRESSURE: 104 MMHG | WEIGHT: 160 LBS | BODY MASS INDEX: 32.25 KG/M2 | RESPIRATION RATE: 14 BRPM | DIASTOLIC BLOOD PRESSURE: 70 MMHG | HEART RATE: 72 BPM | HEIGHT: 59 IN | TEMPERATURE: 99.1 F

## 2020-05-21 PROCEDURE — 73610 X-RAY EXAM OF ANKLE: CPT

## 2020-05-21 PROCEDURE — 99283 EMERGENCY DEPT VISIT LOW MDM: CPT

## 2020-05-21 RX ORDER — ACETAMINOPHEN 500 MG
1000 TABLET ORAL EVERY 8 HOURS PRN
Qty: 50 TABLET | Refills: 0 | Status: SHIPPED | OUTPATIENT
Start: 2020-05-21 | End: 2021-04-05

## 2020-05-21 RX ORDER — IBUPROFEN 400 MG/1
400 TABLET ORAL EVERY 8 HOURS PRN
Qty: 12 TABLET | Refills: 0 | Status: SHIPPED | OUTPATIENT
Start: 2020-05-21 | End: 2020-10-18 | Stop reason: SDUPTHER

## 2020-05-21 ASSESSMENT — PAIN DESCRIPTION - PAIN TYPE: TYPE: ACUTE PAIN

## 2020-05-21 ASSESSMENT — PAIN DESCRIPTION - LOCATION: LOCATION: ANKLE

## 2020-05-21 ASSESSMENT — PAIN SCALES - GENERAL: PAINLEVEL_OUTOF10: 8

## 2020-05-21 ASSESSMENT — PAIN DESCRIPTION - ORIENTATION: ORIENTATION: RIGHT

## 2020-05-21 ASSESSMENT — PAIN DESCRIPTION - DESCRIPTORS: DESCRIPTORS: ACHING

## 2020-05-21 NOTE — ED PROVIDER NOTES
Department of Emergency Medicine   ED  Provider Note  Admit Date/RoomTime: 2020  3:11 PM  ED Room:   Chief Complaint:   Ankle Pain (right ankle pain beginning several months ago)    History of Present Illness   Source of history provided by:  patient. History/Exam Limitations: none. Nasima Campbell is a 44 y.o. old female presenting to the emergency department by private vehicle and ambulatory, for Right ankle pain which occured several month(s) prior to arrival.  Cause of complaint: twisting injury while walking. There has been a history of no prior problems with this area in the past.  Since onset the symptoms have been mild in degree with ability to bear weight, but with some pain. Her pain is aggraveated by walking and pressure and relieved by rest.  Tetanus Status: within past 5 years. ROS    Pertinent positives and negatives are stated within HPI, all other systems reviewed and are negative. Past Surgical History:   Procedure Laterality Date     SECTION      INDUCED   2012   Social History:  reports that she has never smoked. She has never used smokeless tobacco. She reports that she does not drink alcohol or use drugs. Family History: family history is not on file. Allergies: Latex    Physical Exam           ED Triage Vitals   BP Temp Temp Source Pulse Resp SpO2 Height Weight   20 1509 20 1509 20 1509 20 1509 20 1509 20 1509 20 1515 20 1515   104/70 99.1 °F (37.3 °C) Temporal 72 14 98 % 4' 11\" (1.499 m) 160 lb (72.6 kg)       Oxygen Saturation Interpretation: Normal.    Constitutional:  Alert, development consistent with age. Neck:  Normal ROM. Supple. Ankle:  Right Lateral and Medial:              Tenderness:  mild. Swelling: Mild. Deformity: no.             ROM: diminished range with pain. Skin:  no erythema, rash or wounds noted.        Neurovascular: for Pain Take with full glass of water     Electronically signed by Jey Carrizales MD   DD: 5/21/20  **This report was transcribed using voice recognition software. Every effort was made to ensure accuracy; however, inadvertent computerized transcription errors may be present.   END OF ED PROVIDER NOTE        Jey Carrizales MD  05/21/20 9302

## 2020-05-22 ENCOUNTER — CARE COORDINATION (OUTPATIENT)
Dept: CARE COORDINATION | Age: 40
End: 2020-05-22

## 2020-05-22 NOTE — CARE COORDINATION
Call placed patient for COVID-19 Monitoring. Spoke with patient and patient states she is unable to take the call at this time.
for right ankle  CTN reviewed/explained My Chart and Get[employer] Loop with the patient. Patient agrees to enroll in [employer]. Patient will consider My Chart sign up; refer to AVS for instructions.

## 2020-10-18 ENCOUNTER — HOSPITAL ENCOUNTER (EMERGENCY)
Age: 40
Discharge: HOME | End: 2020-10-19
Payer: COMMERCIAL

## 2020-10-18 ENCOUNTER — APPOINTMENT (OUTPATIENT)
Dept: CT IMAGING | Age: 40
End: 2020-10-18
Payer: COMMERCIAL

## 2020-10-18 LAB
ALBUMIN SERPL-MCNC: 3.7 G/DL (ref 3.5–5.2)
ALP BLD-CCNC: 61 U/L (ref 35–104)
ALT SERPL-CCNC: 15 U/L (ref 0–32)
ANION GAP SERPL CALCULATED.3IONS-SCNC: 8 MMOL/L (ref 7–16)
AST SERPL-CCNC: 20 U/L (ref 0–31)
BACTERIA: NORMAL /HPF
BASOPHILS ABSOLUTE: 0.02 E9/L (ref 0–0.2)
BASOPHILS RELATIVE PERCENT: 0.3 % (ref 0–2)
BILIRUB SERPL-MCNC: <0.2 MG/DL (ref 0–1.2)
BILIRUBIN URINE: NEGATIVE
BLOOD, URINE: ABNORMAL
BUN BLDV-MCNC: 12 MG/DL (ref 6–20)
CALCIUM SERPL-MCNC: 8.6 MG/DL (ref 8.6–10.2)
CHLORIDE BLD-SCNC: 107 MMOL/L (ref 98–107)
CLARITY: CLEAR
CO2: 23 MMOL/L (ref 22–29)
COLOR: YELLOW
CREAT SERPL-MCNC: 1 MG/DL (ref 0.5–1)
D DIMER: >5250 NG/ML DDU
EOSINOPHILS ABSOLUTE: 0.05 E9/L (ref 0.05–0.5)
EOSINOPHILS RELATIVE PERCENT: 0.7 % (ref 0–6)
EPITHELIAL CELLS, UA: NORMAL /HPF
GFR AFRICAN AMERICAN: >60
GFR NON-AFRICAN AMERICAN: >60 ML/MIN/1.73
GLUCOSE BLD-MCNC: 94 MG/DL (ref 74–99)
GLUCOSE URINE: NEGATIVE MG/DL
HCG, URINE, POC: NEGATIVE
HCT VFR BLD CALC: 33.6 % (ref 34–48)
HEMOGLOBIN: 11 G/DL (ref 11.5–15.5)
IMMATURE GRANULOCYTES #: 0.02 E9/L
IMMATURE GRANULOCYTES %: 0.3 % (ref 0–5)
KETONES, URINE: NEGATIVE MG/DL
LEUKOCYTE ESTERASE, URINE: NEGATIVE
LYMPHOCYTES ABSOLUTE: 2.72 E9/L (ref 1.5–4)
LYMPHOCYTES RELATIVE PERCENT: 37.9 % (ref 20–42)
Lab: 3215
MCH RBC QN AUTO: 30.2 PG (ref 26–35)
MCHC RBC AUTO-ENTMCNC: 32.7 % (ref 32–34.5)
MCV RBC AUTO: 92.3 FL (ref 80–99.9)
MONOCYTES ABSOLUTE: 0.64 E9/L (ref 0.1–0.95)
MONOCYTES RELATIVE PERCENT: 8.9 % (ref 2–12)
NEGATIVE QC PASS/FAIL: NORMAL
NEUTROPHILS ABSOLUTE: 3.73 E9/L (ref 1.8–7.3)
NEUTROPHILS RELATIVE PERCENT: 51.9 % (ref 43–80)
NITRITE, URINE: NEGATIVE
PDW BLD-RTO: 12.7 FL (ref 11.5–15)
PH UA: 7 (ref 5–9)
PLATELET # BLD: 228 E9/L (ref 130–450)
PMV BLD AUTO: 10.4 FL (ref 7–12)
POSITIVE QC PASS/FAIL: NORMAL
POTASSIUM SERPL-SCNC: 3.8 MMOL/L (ref 3.5–5)
PROTEIN UA: NEGATIVE MG/DL
RBC # BLD: 3.64 E12/L (ref 3.5–5.5)
RBC UA: >20 /HPF (ref 0–2)
SEDIMENTATION RATE, ERYTHROCYTE: 15 MM/HR (ref 0–20)
SODIUM BLD-SCNC: 138 MMOL/L (ref 132–146)
SPECIFIC GRAVITY UA: 1.02 (ref 1–1.03)
TOTAL PROTEIN: 6.6 G/DL (ref 6.4–8.3)
TSH SERPL DL<=0.05 MIU/L-ACNC: 2.14 UIU/ML (ref 0.27–4.2)
UROBILINOGEN, URINE: 0.2 E.U./DL
WBC # BLD: 7.2 E9/L (ref 4.5–11.5)
WBC UA: NORMAL /HPF (ref 0–5)

## 2020-10-18 PROCEDURE — 84443 ASSAY THYROID STIM HORMONE: CPT

## 2020-10-18 PROCEDURE — 71275 CT ANGIOGRAPHY CHEST: CPT

## 2020-10-18 PROCEDURE — 70450 CT HEAD/BRAIN W/O DYE: CPT

## 2020-10-18 PROCEDURE — 96374 THER/PROPH/DIAG INJ IV PUSH: CPT

## 2020-10-18 PROCEDURE — 36415 COLL VENOUS BLD VENIPUNCTURE: CPT

## 2020-10-18 PROCEDURE — 85378 FIBRIN DEGRADE SEMIQUANT: CPT

## 2020-10-18 PROCEDURE — 99283 EMERGENCY DEPT VISIT LOW MDM: CPT

## 2020-10-18 PROCEDURE — 6360000004 HC RX CONTRAST MEDICATION: Performed by: RADIOLOGY

## 2020-10-18 PROCEDURE — 85651 RBC SED RATE NONAUTOMATED: CPT

## 2020-10-18 PROCEDURE — 80053 COMPREHEN METABOLIC PANEL: CPT

## 2020-10-18 PROCEDURE — 85025 COMPLETE CBC W/AUTO DIFF WBC: CPT

## 2020-10-18 PROCEDURE — 81001 URINALYSIS AUTO W/SCOPE: CPT

## 2020-10-18 RX ORDER — KETOROLAC TROMETHAMINE 30 MG/ML
15 INJECTION, SOLUTION INTRAMUSCULAR; INTRAVENOUS ONCE
Status: COMPLETED | OUTPATIENT
Start: 2020-10-19 | End: 2020-10-19

## 2020-10-18 RX ORDER — IBUPROFEN 600 MG/1
600 TABLET ORAL EVERY 6 HOURS PRN
Qty: 20 TABLET | Refills: 0 | Status: SHIPPED | OUTPATIENT
Start: 2020-10-18 | End: 2021-04-05

## 2020-10-18 RX ADMIN — IOPAMIDOL 75 ML: 755 INJECTION, SOLUTION INTRAVENOUS at 23:14

## 2020-10-18 ASSESSMENT — PAIN SCALES - GENERAL: PAINLEVEL_OUTOF10: 9

## 2020-10-18 ASSESSMENT — PAIN DESCRIPTION - PAIN TYPE: TYPE: ACUTE PAIN

## 2020-10-19 VITALS
OXYGEN SATURATION: 99 % | RESPIRATION RATE: 16 BRPM | SYSTOLIC BLOOD PRESSURE: 128 MMHG | HEIGHT: 59 IN | HEART RATE: 75 BPM | TEMPERATURE: 97.7 F | DIASTOLIC BLOOD PRESSURE: 75 MMHG | WEIGHT: 160 LBS | BODY MASS INDEX: 32.25 KG/M2

## 2020-10-19 PROCEDURE — 6360000002 HC RX W HCPCS: Performed by: PHYSICIAN ASSISTANT

## 2020-10-19 RX ADMIN — KETOROLAC TROMETHAMINE 15 MG: 30 INJECTION, SOLUTION INTRAMUSCULAR; INTRAVENOUS at 00:14

## 2020-10-19 ASSESSMENT — PAIN SCALES - GENERAL: PAINLEVEL_OUTOF10: 8

## 2020-10-19 NOTE — ED PROVIDER NOTES
[facility]         Department of Emergency Medicine   ED  Provider Note  Admit Date/RoomTime: 10/18/2020  7:55 PM  ED Room:   MRN: [id number]  Chief Complaint: Other (bilateral axilla pain and R inner thigh pain, describes it as a stinging sensation x 1 week)       History of Present Illness   Source of history provided by:  patient. History/Exam Limitations: none. Yen Nice is a 44 y.o. female who has a past medical history of:   Past Medical History:   Diagnosis Date    Bronchitis     Dental caries     presents to the ED by private car and is alone for stinging electrical shock like pains in the axilla shooting in the breast and in the groin shooting into the inner thighs, beginning 2 weeks ago ago and are stable since that time. The complaint has been persistent, mild in severity. She reports when the pain shoots this is followed by fingers and hands getting a numb feeling. Pt reports she has not been sick otherwise. She does take a PPI for stomach irritation. Pt has HIV and has not been in treatment, when I asked patient about this. She reports that initially they tested positive for HIV but when she was retested by infectious disease they told her she did not have it. She says they have done all kinds of other blood work and nothing. Pt denies rash, boils, fever, cough, uri, nausea, vomiting, diarrhea. ROS    Pertinent positives and negatives are stated within HPI, all other systems reviewed and are negative. Past Surgical History:   Procedure Laterality Date     SECTION      INDUCED   2012   Social History:  reports that she has never smoked. She has never used smokeless tobacco. She reports that she does not drink alcohol or use drugs. Family History: family history is not on file.   Allergies: Latex    Physical Exam   Oxygen Saturation Interpretation: Normal.   ED Triage Vitals   BP Temp Temp src Pulse Resp SpO2 Height Weight   10/18/20 1921 10/18/20 1920 -- 10/18/20 1920 10/18/20 1920 10/18/20 1920 10/18/20 1920 10/18/20 1920   136/75 97.7 °F (36.5 °C)  72 16 99 % 4' 11\" (1.499 m) 160 lb (72.6 kg)       Physical Exam  · Constitutional/General: Alert and oriented x3, well appearing, non toxic  · HEENT:  NC/NT. PERRLA,  Airway patent. Oropharynx unremarkable. TM normal bilateral  · Neck: Supple, full ROM, non tender to palpation in the midline, no stridor, no crepitus, no meningeal signs  · Respiratory: Lungs clear to auscultation bilaterally, no wheezes, rales, or rhonchi. Not in respiratory distress  · CV:  Regular rate. Regular rhythm. No murmurs, gallops, or rubs. 2+ distal pulses  · Chest:Pt has a left breast lump about size of small pea, far lateral which is tender, there are no other lesion, no axillary abscess or swelling bilateral.  · GI:  Abdomen Soft, Non tender, Non distended. +BS. No rebound, guarding, or rigidity. No pulsatile masses. · Musculoskeletal: Moves all extremities x 4. Warm and well perfused, no clubbing, cyanosis, or edema. Capillary refill <3 seconds  · Integument: skin warm and dry. No rashes. · Lymphatic: no lymphadenopathy noted  · Neurologic: GCS 15, no focal deficits, symmetric strength 5/5 in the upper and lower extremities bilaterally. No pronator drift, no past-pointing, rapid motions intact, normal gait. Brachioradialis, biceps, triceps reflexes +2/4 bilaterally.   · Psychiatric: Normal Affect    Lab / Imaging Results   (All laboratory and radiology results have been personally reviewed by myself)  Labs:  Results for orders placed or performed during the hospital encounter of 10/18/20   CBC Auto Differential   Result Value Ref Range    WBC 7.2 4.5 - 11.5 E9/L    RBC 3.64 3.50 - 5.50 E12/L    Hemoglobin 11.0 (L) 11.5 - 15.5 g/dL    Hematocrit 33.6 (L) 34.0 - 48.0 %    MCV 92.3 80.0 - 99.9 fL    MCH 30.2 26.0 - 35.0 pg    MCHC 32.7 32.0 - 34.5 %    RDW 12.7 11.5 - 15.0 fL    Platelets 543 404 - 822 E9/L    MPV 10.4 7.0 - 12.0 fL    Neutrophils % 51.9 43.0 - 80.0 %    Immature Granulocytes % 0.3 0.0 - 5.0 %    Lymphocytes % 37.9 20.0 - 42.0 %    Monocytes % 8.9 2.0 - 12.0 %    Eosinophils % 0.7 0.0 - 6.0 %    Basophils % 0.3 0.0 - 2.0 %    Neutrophils Absolute 3.73 1.80 - 7.30 E9/L    Immature Granulocytes # 0.02 E9/L    Lymphocytes Absolute 2.72 1.50 - 4.00 E9/L    Monocytes Absolute 0.64 0.10 - 0.95 E9/L    Eosinophils Absolute 0.05 0.05 - 0.50 E9/L    Basophils Absolute 0.02 0.00 - 0.20 E9/L   Comprehensive Metabolic Panel   Result Value Ref Range    Sodium 138 132 - 146 mmol/L    Potassium 3.8 3.5 - 5.0 mmol/L    Chloride 107 98 - 107 mmol/L    CO2 23 22 - 29 mmol/L    Anion Gap 8 7 - 16 mmol/L    Glucose 94 74 - 99 mg/dL    BUN 12 6 - 20 mg/dL    CREATININE 1.0 0.5 - 1.0 mg/dL    GFR Non-African American >60 >=60 mL/min/1.73    GFR African American >60     Calcium 8.6 8.6 - 10.2 mg/dL    Total Protein 6.6 6.4 - 8.3 g/dL    Alb 3.7 3.5 - 5.2 g/dL    Total Bilirubin <0.2 0.0 - 1.2 mg/dL    Alkaline Phosphatase 61 35 - 104 U/L    ALT 15 0 - 32 U/L    AST 20 0 - 31 U/L   TSH without Reflex   Result Value Ref Range    TSH 2.140 0.270 - 4.200 uIU/mL   D-dimer, quantitative   Result Value Ref Range    D-Dimer, Quant >5250 ng/mL DDU   Urinalysis   Result Value Ref Range    Color, UA Yellow Straw/Yellow    Clarity, UA Clear Clear    Glucose, Ur Negative Negative mg/dL    Bilirubin Urine Negative Negative    Ketones, Urine Negative Negative mg/dL    Specific Gravity, UA 1.020 1.005 - 1.030    Blood, Urine LARGE (A) Negative    pH, UA 7.0 5.0 - 9.0    Protein, UA Negative Negative mg/dL    Urobilinogen, Urine 0.2 <2.0 E.U./dL    Nitrite, Urine Negative Negative    Leukocyte Esterase, Urine Negative Negative   Sedimentation Rate   Result Value Ref Range    Sed Rate 15 0 - 20 mm/Hr   Microscopic Urinalysis   Result Value Ref Range    WBC, UA 0-1 0 - 5 /HPF    RBC, UA >20 0 - 2 /HPF    Epithelial Cells, UA RARE /HPF    Bacteria, UA NONE SEEN None Seen /HPF   POC Pregnancy Urine Qual   Result Value Ref Range    HCG, Urine, POC Negative Negative    Lot Number [id number]     Positive QC Pass/Fail Pass     Negative QC Pass/Fail Pass      Imaging: All Radiology results interpreted by Radiologist unless otherwise noted. CTA PULMONARY W CONTRAST   Final Result   No evidence of pulmonary embolism or acute pulmonary abnormality. CT HEAD WO CONTRAST   Final Result   No acute intracranial abnormality. ED Course / Medical Decision Making     Medications   ketorolac (TORADOL) injection 15 mg (has no administration in time range)   iopamidol (ISOVUE-370) 76 % injection 75 mL (75 mLs Intravenous Given 10/18/20 4385)        Re-examination:  10/18/20       Time: pt is comfortable. Consult(s):   None    Procedure(s):   none    MDM:   Patient presents to emergency with electrical shocklike pains emanating from the bilateral axillary areas into the breasts laterally. Patient also reports similar feelings in the inner thighs. Patient has no other related symptoms of note. D-dimer was drawn in department and had a greater than 5250. Patient reported that she has had that finding before. Chart review showed that she has had that number on other D-dimer tests that were done. Nonetheless due to chest pain a CTA was performed and was negative for PE. There is some ambiguity as to whether or not the patient has a HIV. Chart review shows that she did have HIV testing and was positive initially but then confirmatory test were negative. She did follow-up with infectious disease and they were planning on doing additional testing but she never followed up for the blood work. Patient also has a small lump in the left breast which she found. There is tenderness around the area but no erythema or induration of the tissues. Patient already has a mammogram scheduled.   Patient is advised to follow-up with primary care physician for further evaluation and possible referral to hematology relating to the elevated D-dimer. I am wondering if maybe it is just lab issue with her blood specifically because a D-dimer at that level patient should be in DIC and she was completely comfortable with normal vital signs, normal CTA, other labs normal.     Counseling:  Physician Assistant discussed today's results with the patient in addition to providing specific details for the plan of care and counseling regarding the diagnosis and prognosis. Questions are answered at this time and they are agreeable with the plan. Assessment      1. Neuropathy      Plan   Discharge to home  Patient condition is good    New Medications     Current Discharge Medication List        Electronically signed by Ana Del Valle PA-C   DD: 10/18/20  **This report was transcribed using voice recognition software. Every effort was made to ensure accuracy; however, inadvertent computerized transcription errors may be present.   END OF ED PROVIDER NOTE       Ana Del Valle PA-C  10/20/20 5649

## 2020-10-21 ENCOUNTER — HOSPITAL ENCOUNTER (EMERGENCY)
Age: 40
Discharge: HOME | End: 2020-10-22
Attending: EMERGENCY MEDICINE
Payer: COMMERCIAL

## 2020-10-21 ENCOUNTER — APPOINTMENT (OUTPATIENT)
Dept: CT IMAGING | Age: 40
End: 2020-10-21
Payer: COMMERCIAL

## 2020-10-21 VITALS
BODY MASS INDEX: 32.25 KG/M2 | HEART RATE: 86 BPM | RESPIRATION RATE: 16 BRPM | SYSTOLIC BLOOD PRESSURE: 137 MMHG | DIASTOLIC BLOOD PRESSURE: 80 MMHG | OXYGEN SATURATION: 98 % | HEIGHT: 59 IN | TEMPERATURE: 98.2 F | WEIGHT: 160 LBS

## 2020-10-21 LAB
ALBUMIN SERPL-MCNC: 3.8 G/DL (ref 3.5–5.2)
ALP BLD-CCNC: 63 U/L (ref 35–104)
ALT SERPL-CCNC: 16 U/L (ref 0–32)
AMPHETAMINE SCREEN, URINE: NOT DETECTED
ANION GAP SERPL CALCULATED.3IONS-SCNC: 6 MMOL/L (ref 7–16)
AST SERPL-CCNC: 20 U/L (ref 0–31)
BACTERIA: NORMAL /HPF
BARBITURATE SCREEN URINE: NOT DETECTED
BASOPHILS ABSOLUTE: 0.01 E9/L (ref 0–0.2)
BASOPHILS RELATIVE PERCENT: 0.1 % (ref 0–2)
BENZODIAZEPINE SCREEN, URINE: NOT DETECTED
BILIRUB SERPL-MCNC: <0.2 MG/DL (ref 0–1.2)
BILIRUBIN URINE: NEGATIVE
BLOOD, URINE: ABNORMAL
BUN BLDV-MCNC: 14 MG/DL (ref 6–20)
CALCIUM SERPL-MCNC: 9 MG/DL (ref 8.6–10.2)
CANNABINOID SCREEN URINE: NOT DETECTED
CHLORIDE BLD-SCNC: 107 MMOL/L (ref 98–107)
CLARITY: CLEAR
CO2: 23 MMOL/L (ref 22–29)
COCAINE METABOLITE SCREEN URINE: NOT DETECTED
COLOR: YELLOW
CREAT SERPL-MCNC: 1 MG/DL (ref 0.5–1)
EOSINOPHILS ABSOLUTE: 0.03 E9/L (ref 0.05–0.5)
EOSINOPHILS RELATIVE PERCENT: 0.4 % (ref 0–6)
EPITHELIAL CELLS, UA: NORMAL /HPF
FENTANYL SCREEN, URINE: NOT DETECTED
GFR AFRICAN AMERICAN: >60
GFR NON-AFRICAN AMERICAN: >60 ML/MIN/1.73
GLUCOSE BLD-MCNC: 90 MG/DL (ref 74–99)
GLUCOSE URINE: NEGATIVE MG/DL
HCG(URINE) PREGNANCY TEST: NEGATIVE
HCT VFR BLD CALC: 35.9 % (ref 34–48)
HEMOGLOBIN: 12.1 G/DL (ref 11.5–15.5)
IMMATURE GRANULOCYTES #: 0.02 E9/L
IMMATURE GRANULOCYTES %: 0.3 % (ref 0–5)
KETONES, URINE: NEGATIVE MG/DL
LEUKOCYTE ESTERASE, URINE: NEGATIVE
LYMPHOCYTES ABSOLUTE: 2.55 E9/L (ref 1.5–4)
LYMPHOCYTES RELATIVE PERCENT: 34.1 % (ref 20–42)
Lab: NORMAL
MAGNESIUM: 2 MG/DL (ref 1.6–2.6)
MCH RBC QN AUTO: 30.7 PG (ref 26–35)
MCHC RBC AUTO-ENTMCNC: 33.7 % (ref 32–34.5)
MCV RBC AUTO: 91.1 FL (ref 80–99.9)
METHADONE SCREEN, URINE: NOT DETECTED
MONOCYTES ABSOLUTE: 0.63 E9/L (ref 0.1–0.95)
MONOCYTES RELATIVE PERCENT: 8.4 % (ref 2–12)
NEUTROPHILS ABSOLUTE: 4.24 E9/L (ref 1.8–7.3)
NEUTROPHILS RELATIVE PERCENT: 56.7 % (ref 43–80)
NITRITE, URINE: NEGATIVE
OPIATE SCREEN URINE: NOT DETECTED
OXYCODONE URINE: NOT DETECTED
PDW BLD-RTO: 12.6 FL (ref 11.5–15)
PH UA: 7.5 (ref 5–9)
PHENCYCLIDINE SCREEN URINE: NOT DETECTED
PLATELET # BLD: 261 E9/L (ref 130–450)
PMV BLD AUTO: 10.5 FL (ref 7–12)
POTASSIUM REFLEX MAGNESIUM: 3.5 MMOL/L (ref 3.5–5)
PROTEIN UA: NEGATIVE MG/DL
RBC # BLD: 3.94 E12/L (ref 3.5–5.5)
RBC UA: NORMAL /HPF (ref 0–2)
SODIUM BLD-SCNC: 136 MMOL/L (ref 132–146)
SPECIFIC GRAVITY UA: 1.01 (ref 1–1.03)
TOTAL PROTEIN: 7.2 G/DL (ref 6.4–8.3)
UROBILINOGEN, URINE: 0.2 E.U./DL
WBC # BLD: 7.5 E9/L (ref 4.5–11.5)
WBC UA: NORMAL /HPF (ref 0–5)

## 2020-10-21 PROCEDURE — 70450 CT HEAD/BRAIN W/O DYE: CPT

## 2020-10-21 PROCEDURE — 80307 DRUG TEST PRSMV CHEM ANLYZR: CPT

## 2020-10-21 PROCEDURE — 99284 EMERGENCY DEPT VISIT MOD MDM: CPT

## 2020-10-21 PROCEDURE — 81025 URINE PREGNANCY TEST: CPT

## 2020-10-21 PROCEDURE — 81001 URINALYSIS AUTO W/SCOPE: CPT

## 2020-10-21 PROCEDURE — 80053 COMPREHEN METABOLIC PANEL: CPT

## 2020-10-21 PROCEDURE — G0480 DRUG TEST DEF 1-7 CLASSES: HCPCS

## 2020-10-21 PROCEDURE — 83735 ASSAY OF MAGNESIUM: CPT

## 2020-10-21 PROCEDURE — 85025 COMPLETE CBC W/AUTO DIFF WBC: CPT

## 2020-10-21 PROCEDURE — 72125 CT NECK SPINE W/O DYE: CPT

## 2020-10-21 ASSESSMENT — PAIN DESCRIPTION - PAIN TYPE: TYPE: ACUTE PAIN

## 2020-10-21 ASSESSMENT — PAIN SCALES - GENERAL: PAINLEVEL_OUTOF10: 10

## 2020-10-21 NOTE — ED NOTES
FIRST PROVIDER CONTACT ASSESSMENT NOTE      Department of Emergency Medicine   10/21/20  6:18 PM EDT    Chief Complaint: Headache (left sided numbness, back pain, on and off for the past week, seen here 2 days ago for same issue, denies hx of migraines or dm2); Numbness; and Back Pain      History of Present Illness:   Juaquin Noonan is a 44 y.o. female who presents to the ED for headache,neck pain, left sided numbness ongoing for 1 week    Medical History:  has a past medical history of Bronchitis and Dental caries. Surgical History:  has a past surgical history that includes Induced  (2012) and  section. Social History:  reports that she has never smoked. She has never used smokeless tobacco. She reports that she does not drink alcohol or use drugs. Family History: family history is not on file.     *ALLERGIES*     Latex     Physical Exam:      VS:  /80   Pulse 86   Temp 98.2 °F (36.8 °C)   Resp 16   Ht 4' 11\" (1.499 m)   Wt 160 lb (72.6 kg)   SpO2 98%   BMI 32.32 kg/m²      Initial Plan of Care:  Initiate Treatment-Testing, Proceed toTreatment Area When Bed Available for ED Attending/MLP to Continue Care    -----------------END OF FIRST PROVIDER CONTACT ASSESSMENT NOTE--------------  Electronically signed by JUANIS Gomez CNP   DD: 10/21/20           JUANIS Gomez CNP  10/21/20 1819

## 2020-10-22 LAB
ACETAMINOPHEN LEVEL: <5 MCG/ML (ref 10–30)
ETHANOL: <10 MG/DL (ref 0–0.08)
SALICYLATE, SERUM: <0.3 MG/DL (ref 0–30)
TRICYCLIC ANTIDEPRESSANTS SCREEN SERUM: NEGATIVE NG/ML

## 2020-10-22 ASSESSMENT — ENCOUNTER SYMPTOMS
ABDOMINAL PAIN: 0
COUGH: 0
SHORTNESS OF BREATH: 0
BACK PAIN: 0

## 2020-10-22 NOTE — ED PROVIDER NOTES
This is a 66-year-old female with no significant past medical history who presents to the ED for evaluation of headache. Patient states that for the past several weeks she been having intermittent episodes where she has diffuse headaches are neither mitigating or exacerbating factors. Patient states she is also been having some episodes where she feels as though she is having pins-and-needles going to her breast.  Patient states she also having episodes where she feels though she is having some numbness that is in no specific pattern sometimes in her legs and arms in her arms times in her face. She states that he was seen about a week ago and had a CT of her chest showed no PE. Patient states he has not yet followed up with a neurologist nor has she followed up with her PCP. Patient is earlier today should have some pins and needle sensation to her left leg states it is since resolved. Patient denies any actual weakness. She denies any use of IV drugs back pain incontinence saddle anesthesia fevers or chills. Of note the patient is adamant that she has no history of HIV discussed in the previous HPI when she was here. The history is provided by the patient. No  was used. Review of Systems   Constitutional: Negative for fever. HENT: Negative for congestion. Eyes: Negative for visual disturbance. Respiratory: Negative for cough and shortness of breath. Cardiovascular: Negative for chest pain. Gastrointestinal: Negative for abdominal pain. Endocrine: Negative for polyuria. Genitourinary: Negative for dysuria. Musculoskeletal: Negative for back pain. Skin: Negative for rash. Allergic/Immunologic: Negative for immunocompromised state. Neurological: Positive for light-headedness and numbness. Hematological: Does not bruise/bleed easily. Psychiatric/Behavioral: Negative for confusion. Physical Exam  Vitals signs and nursing note reviewed. Constitutional:       Appearance: Normal appearance. HENT:      Head: Normocephalic and atraumatic. Mouth/Throat:      Mouth: Mucous membranes are moist.   Eyes:      Extraocular Movements: Extraocular movements intact. Pupils: Pupils are equal, round, and reactive to light. Neck:      Musculoskeletal: Normal range of motion and neck supple. Cardiovascular:      Rate and Rhythm: Normal rate. Heart sounds: No murmur. Pulmonary:      Effort: Pulmonary effort is normal.      Breath sounds: Normal breath sounds. Abdominal:      Palpations: Abdomen is soft. Tenderness: There is no abdominal tenderness. There is no right CVA tenderness, left CVA tenderness, guarding or rebound. Hernia: No hernia is present. Musculoskeletal:      Right lower leg: No edema. Left lower leg: No edema. Comments: No mildine c-spine or t-spine or l-spine tenderness or stepoffs   Skin:     General: Skin is warm. Capillary Refill: Capillary refill takes less than 2 seconds. Findings: No lesion. Neurological:      General: No focal deficit present. Mental Status: She is alert. Mental status is at baseline. Cranial Nerves: No cranial nerve deficit. Comments: Normal gait, strength 5/5 to bilateral upper and lower extremities, no mengingeal signs, sensation intact to bilateral lower extremities as well as upper extremities   Psychiatric:         Mood and Affect: Mood normal.          Procedures     MDM  Number of Diagnoses or Management Options  Paresthesia:   Diagnosis management comments: Patient presented to the ED for evaluation for a number of different neurological complaints including a headache that has been intermittent for two weeks, pins and needles sensation for the past several weeks that is intermittent. Here in the ED the patient was able to walk without difficulty and normal strength and no meningeal signs. The patient was adamant about not having HIV.  Patient Basophils % 0.1 0.0 - 2.0 %    Neutrophils Absolute 4.24 1.80 - 7.30 E9/L    Immature Granulocytes # 0.02 E9/L    Lymphocytes Absolute 2.55 1.50 - 4.00 E9/L    Monocytes Absolute 0.63 0.10 - 0.95 E9/L    Eosinophils Absolute 0.03 (L) 0.05 - 0.50 E9/L    Basophils Absolute 0.01 0.00 - 0.20 E9/L   Comprehensive Metabolic Panel w/ Reflex to MG   Result Value Ref Range    Sodium 136 132 - 146 mmol/L    Potassium reflex Magnesium 3.5 3.5 - 5.0 mmol/L    Chloride 107 98 - 107 mmol/L    CO2 23 22 - 29 mmol/L    Anion Gap 6 (L) 7 - 16 mmol/L    Glucose 90 74 - 99 mg/dL    BUN 14 6 - 20 mg/dL    CREATININE 1.0 0.5 - 1.0 mg/dL    GFR Non-African American >60 >=60 mL/min/1.73    GFR African American >60     Calcium 9.0 8.6 - 10.2 mg/dL    Total Protein 7.2 6.4 - 8.3 g/dL    Alb 3.8 3.5 - 5.2 g/dL    Total Bilirubin <0.2 0.0 - 1.2 mg/dL    Alkaline Phosphatase 63 35 - 104 U/L    ALT 16 0 - 32 U/L    AST 20 0 - 31 U/L   Urinalysis, reflex to microscopic   Result Value Ref Range    Color, UA Yellow Straw/Yellow    Clarity, UA Clear Clear    Glucose, Ur Negative Negative mg/dL    Bilirubin Urine Negative Negative    Ketones, Urine Negative Negative mg/dL    Specific Gravity, UA 1.010 1.005 - 1.030    Blood, Urine SMALL (A) Negative    pH, UA 7.5 5.0 - 9.0    Protein, UA Negative Negative mg/dL    Urobilinogen, Urine 0.2 <2.0 E.U./dL    Nitrite, Urine Negative Negative    Leukocyte Esterase, Urine Negative Negative   Serum Drug Screen   Result Value Ref Range    Ethanol Lvl <10 mg/dL    Acetaminophen Level <5.0 (L) 10.0 - 69.5 mcg/mL    Salicylate, Serum <3.7 0.0 - 30.0 mg/dL   URINE DRUG SCREEN   Result Value Ref Range    Amphetamine Screen, Urine NOT DETECTED Negative <1000 ng/mL    Barbiturate Screen, Ur NOT DETECTED Negative < 200 ng/mL    Benzodiazepine Screen, Urine NOT DETECTED Negative < 200 ng/mL    Cannabinoid Scrn, Ur NOT DETECTED Negative < 50ng/mL    Cocaine Metabolite Screen, Urine NOT DETECTED Negative < 300 ng/mL They will followup with their PCP.      --------------------------------- ADDITIONAL PROVIDER NOTES ---------------------------------  At this time the patient is without objective evidence of an acute process requiring hospitalization or inpatient management. They have remained hemodynamically stable throughout their entire ED visit and are stable for discharge with outpatient follow-up. The plan has been discussed in detail and they are aware of the specific conditions for emergent return, as well as the importance of follow-up. Discharge Medication List as of 10/21/2020 11:45 PM          Diagnosis:  1. Paresthesia        Disposition:  Patient's disposition: Discharge to home  Patient's condition is stable.       Kaylene Nuñez DO  10/22/20 1614

## 2020-12-27 ENCOUNTER — HOSPITAL ENCOUNTER (EMERGENCY)
Age: 40
Discharge: HOME | End: 2020-12-27
Attending: EMERGENCY MEDICINE
Payer: COMMERCIAL

## 2020-12-27 ENCOUNTER — APPOINTMENT (OUTPATIENT)
Dept: CT IMAGING | Age: 40
End: 2020-12-27
Payer: COMMERCIAL

## 2020-12-27 VITALS
HEIGHT: 59 IN | RESPIRATION RATE: 18 BRPM | HEART RATE: 70 BPM | TEMPERATURE: 97.7 F | WEIGHT: 160 LBS | DIASTOLIC BLOOD PRESSURE: 66 MMHG | BODY MASS INDEX: 32.25 KG/M2 | OXYGEN SATURATION: 98 % | SYSTOLIC BLOOD PRESSURE: 113 MMHG

## 2020-12-27 LAB
ANION GAP SERPL CALCULATED.3IONS-SCNC: 9 MMOL/L (ref 7–16)
BACTERIA: ABNORMAL /HPF
BASOPHILS ABSOLUTE: 0.01 E9/L (ref 0–0.2)
BASOPHILS RELATIVE PERCENT: 0.2 % (ref 0–2)
BILIRUBIN URINE: NEGATIVE
BLOOD, URINE: ABNORMAL
BUN BLDV-MCNC: 13 MG/DL (ref 6–20)
CALCIUM SERPL-MCNC: 9 MG/DL (ref 8.6–10.2)
CHLORIDE BLD-SCNC: 108 MMOL/L (ref 98–107)
CLARITY: ABNORMAL
CO2: 24 MMOL/L (ref 22–29)
COLOR: YELLOW
CREAT SERPL-MCNC: 0.9 MG/DL (ref 0.5–1)
EOSINOPHILS ABSOLUTE: 0.04 E9/L (ref 0.05–0.5)
EOSINOPHILS RELATIVE PERCENT: 0.7 % (ref 0–6)
EPITHELIAL CELLS, UA: ABNORMAL /HPF
GFR AFRICAN AMERICAN: >60
GFR NON-AFRICAN AMERICAN: >60 ML/MIN/1.73
GLUCOSE BLD-MCNC: 93 MG/DL (ref 74–99)
GLUCOSE URINE: NEGATIVE MG/DL
HCG(URINE) PREGNANCY TEST: NEGATIVE
HCT VFR BLD CALC: 34.9 % (ref 34–48)
HEMOGLOBIN: 11.8 G/DL (ref 11.5–15.5)
IMMATURE GRANULOCYTES #: 0.02 E9/L
IMMATURE GRANULOCYTES %: 0.3 % (ref 0–5)
KETONES, URINE: NEGATIVE MG/DL
LEUKOCYTE ESTERASE, URINE: NEGATIVE
LYMPHOCYTES ABSOLUTE: 2.2 E9/L (ref 1.5–4)
LYMPHOCYTES RELATIVE PERCENT: 37.7 % (ref 20–42)
MCH RBC QN AUTO: 31.3 PG (ref 26–35)
MCHC RBC AUTO-ENTMCNC: 33.8 % (ref 32–34.5)
MCV RBC AUTO: 92.6 FL (ref 80–99.9)
MONOCYTES ABSOLUTE: 0.58 E9/L (ref 0.1–0.95)
MONOCYTES RELATIVE PERCENT: 9.9 % (ref 2–12)
MUCUS: PRESENT /LPF
NEUTROPHILS ABSOLUTE: 2.98 E9/L (ref 1.8–7.3)
NEUTROPHILS RELATIVE PERCENT: 51.2 % (ref 43–80)
NITRITE, URINE: NEGATIVE
PDW BLD-RTO: 12.8 FL (ref 11.5–15)
PH UA: 6 (ref 5–9)
PLATELET # BLD: 210 E9/L (ref 130–450)
PMV BLD AUTO: 10.1 FL (ref 7–12)
POTASSIUM REFLEX MAGNESIUM: 4.7 MMOL/L (ref 3.5–5)
PROTEIN UA: NEGATIVE MG/DL
RBC # BLD: 3.77 E12/L (ref 3.5–5.5)
RBC UA: >20 /HPF (ref 0–2)
RENAL EPITHELIAL, UA: ABNORMAL /HPF
SODIUM BLD-SCNC: 141 MMOL/L (ref 132–146)
SPECIFIC GRAVITY UA: >=1.03 (ref 1–1.03)
UROBILINOGEN, URINE: 0.2 E.U./DL
WBC # BLD: 5.8 E9/L (ref 4.5–11.5)
WBC UA: ABNORMAL /HPF (ref 0–5)

## 2020-12-27 PROCEDURE — 36415 COLL VENOUS BLD VENIPUNCTURE: CPT

## 2020-12-27 PROCEDURE — 74176 CT ABD & PELVIS W/O CONTRAST: CPT

## 2020-12-27 PROCEDURE — 81001 URINALYSIS AUTO W/SCOPE: CPT

## 2020-12-27 PROCEDURE — 80048 BASIC METABOLIC PNL TOTAL CA: CPT

## 2020-12-27 PROCEDURE — 85025 COMPLETE CBC W/AUTO DIFF WBC: CPT

## 2020-12-27 PROCEDURE — 87088 URINE BACTERIA CULTURE: CPT

## 2020-12-27 PROCEDURE — 99283 EMERGENCY DEPT VISIT LOW MDM: CPT

## 2020-12-27 PROCEDURE — 81025 URINE PREGNANCY TEST: CPT

## 2020-12-27 RX ORDER — NAPROXEN 500 MG/1
500 TABLET ORAL 2 TIMES DAILY PRN
Qty: 60 TABLET | Refills: 0 | Status: SHIPPED | OUTPATIENT
Start: 2020-12-27 | End: 2021-04-05

## 2020-12-27 RX ORDER — PHENAZOPYRIDINE HYDROCHLORIDE 100 MG/1
100 TABLET, FILM COATED ORAL 3 TIMES DAILY PRN
Qty: 6 TABLET | Refills: 0 | Status: SHIPPED | OUTPATIENT
Start: 2020-12-27 | End: 2020-12-30

## 2020-12-27 RX ORDER — CEPHALEXIN 500 MG/1
500 CAPSULE ORAL 2 TIMES DAILY
Qty: 14 CAPSULE | Refills: 0 | Status: SHIPPED | OUTPATIENT
Start: 2020-12-27 | End: 2021-01-03

## 2020-12-27 RX ORDER — POLYETHYLENE GLYCOL 3350 17 G/17G
17 POWDER, FOR SOLUTION ORAL DAILY
Qty: 510 G | Refills: 0 | Status: SHIPPED | OUTPATIENT
Start: 2020-12-27 | End: 2021-01-26

## 2020-12-27 ASSESSMENT — PAIN DESCRIPTION - ORIENTATION: ORIENTATION: LEFT

## 2020-12-27 ASSESSMENT — PAIN DESCRIPTION - DESCRIPTORS: DESCRIPTORS: DISCOMFORT

## 2020-12-27 ASSESSMENT — PAIN DESCRIPTION - LOCATION: LOCATION: FLANK

## 2020-12-27 ASSESSMENT — PAIN SCALES - GENERAL: PAINLEVEL_OUTOF10: 6

## 2020-12-27 NOTE — ED NOTES
Patient given discharge paperwork at this time. Patient also given scripts. Patient has no further questions at this time. Nurse provided education to patient. Patient is alert and oriented and VS are stable at this time.         Levonne Sicard, RN  12/27/20 2468

## 2020-12-27 NOTE — ED PROVIDER NOTES
HPI:  20,   Time: 7:55 AM JULISSA Tamayo is a 36 y.o. female presenting to the ED for urinary frequency/left flank pain, beginning 2 weeks ago. The complaint has been intermittent, mild in severity, and worsened by nothing. Initially inc urinary freq, now flank pain. Some change in urine color, no pain. Concerned has kidney infection. No n/v/d/abd pain/fever/chills/sweats. No hx same, nothing makes better    Review of Systems:   Pertinent positives and negatives are stated within HPI, all other systems reviewed and are negative.          --------------------------------------------- PAST HISTORY ---------------------------------------------  Past Medical History:  has a past medical history of Bronchitis and Dental caries. Past Surgical History:  has a past surgical history that includes Induced  (2012) and  section. Social History:  reports that she has never smoked. She has never used smokeless tobacco. She reports current drug use. Drug: Marijuana. She reports that she does not drink alcohol. Family History: family history is not on file. The patients home medications have been reviewed. Allergies: Latex        ---------------------------------------------------PHYSICAL EXAM--------------------------------------    Constitutional/General: Alert and oriented x3, well appearing, non toxic in NAD  Head: Normocephalic and atraumatic  Eyes: PERRL, EOMI, conjunctive normal, sclera non icteric  Mouth: Oropharynx clear, handling secretions,   Neck: Supple, full ROM,   Respiratory: Not in respiratory distress  Cardiovascular:  2+ distal pulses    GI:  Abdomen Soft, Non tender, Non distended. Left cvat   Musculoskeletal: Moves all extremities x 4. Warm and well perfused, no clubbing, cyanosis, or edema. Capillary refill <3 seconds  Integument: skin warm and dry. No rashes.    Lymphatic: no lymphadenopathy noted  Neurologic: GCS 15, no focal deficits, symmetric strength 5/5 in the upper and lower extremities bilaterally  Psychiatric: Normal Affect    -------------------------------------------------- RESULTS -------------------------------------------------  I have personally reviewed all laboratory and imaging results for this patient. Results are listed below.      LABS:  Results for orders placed or performed during the hospital encounter of 12/27/20   Culture, Urine    Specimen: Urine, clean catch   Result Value Ref Range    Urine Culture, Routine Growth not present, incubation continues    Urinalysis, reflex to microscopic   Result Value Ref Range    Color, UA Yellow Straw/Yellow    Clarity, UA SL CLOUDY Clear    Glucose, Ur Negative Negative mg/dL    Bilirubin Urine Negative Negative    Ketones, Urine Negative Negative mg/dL    Specific Gravity, UA >=1.030 1.005 - 1.030    Blood, Urine LARGE (A) Negative    pH, UA 6.0 5.0 - 9.0    Protein, UA Negative Negative mg/dL    Urobilinogen, Urine 0.2 <2.0 E.U./dL    Nitrite, Urine Negative Negative    Leukocyte Esterase, Urine Negative Negative   Pregnancy, Urine   Result Value Ref Range    HCG(Urine) Pregnancy Test NEGATIVE NEGATIVE   Microscopic Urinalysis   Result Value Ref Range    Mucus, UA Present (A) None Seen /LPF    WBC, UA 2-5 0 - 5 /HPF    RBC, UA >20 0 - 2 /HPF    Epithelial Cells, UA FEW /HPF    Renal Epithelial, UA FEW /HPF    Bacteria, UA FEW (A) None Seen /HPF   CBC Auto Differential   Result Value Ref Range    WBC 5.8 4.5 - 11.5 E9/L    RBC 3.77 3.50 - 5.50 E12/L    Hemoglobin 11.8 11.5 - 15.5 g/dL    Hematocrit 34.9 34.0 - 48.0 %    MCV 92.6 80.0 - 99.9 fL    MCH 31.3 26.0 - 35.0 pg    MCHC 33.8 32.0 - 34.5 %    RDW 12.8 11.5 - 15.0 fL    Platelets 042 494 - 516 E9/L    MPV 10.1 7.0 - 12.0 fL    Neutrophils % 51.2 43.0 - 80.0 %    Immature Granulocytes % 0.3 0.0 - 5.0 %    Lymphocytes % 37.7 20.0 - 42.0 %    Monocytes % 9.9 2.0 - 12.0 %    Eosinophils % 0.7 0.0 - 6.0 %    Basophils % 0.2 0.0 - 2.0 % Neutrophils Absolute 2.98 1.80 - 7.30 E9/L    Immature Granulocytes # 0.02 E9/L    Lymphocytes Absolute 2.20 1.50 - 4.00 E9/L    Monocytes Absolute 0.58 0.10 - 0.95 E9/L    Eosinophils Absolute 0.04 (L) 0.05 - 0.50 E9/L    Basophils Absolute 0.01 0.00 - 0.20 C2/D   Basic Metabolic Panel w/ Reflex to MG   Result Value Ref Range    Sodium 141 132 - 146 mmol/L    Potassium reflex Magnesium 4.7 3.5 - 5.0 mmol/L    Chloride 108 (H) 98 - 107 mmol/L    CO2 24 22 - 29 mmol/L    Anion Gap 9 7 - 16 mmol/L    Glucose 93 74 - 99 mg/dL    BUN 13 6 - 20 mg/dL    CREATININE 0.9 0.5 - 1.0 mg/dL    GFR Non-African American >60 >=60 mL/min/1.73    GFR African American >60     Calcium 9.0 8.6 - 10.2 mg/dL       RADIOLOGY:  Interpreted by Radiologist.  CT ABDOMEN PELVIS WO CONTRAST Additional Contrast? None   Final Result   1. No acute abnormality is seen in the abdomen or the pelvis. 2. No urolithiasis or hydronephrosis. 3. Constipation. No bowel wall thickening or evidence of mechanical   obstruction. 4. Contraceptive device is seen in the bilateral fallopian tubes. EKG: This EKG is signed and interpreted by the EP. Time:   Rate:   Rhythm:   Interpretation:   Comparison:       ------------------------- NURSING NOTES AND VITALS REVIEWED ---------------------------   The nursing notes within the ED encounter and vital signs as below have been reviewed by myself. /66   Pulse 70   Temp 97.7 °F (36.5 °C) (Infrared)   Resp 18   Ht 4' 11\" (1.499 m)   Wt 160 lb (72.6 kg)   LMP 12/26/2020   SpO2 98%   BMI 32.32 kg/m²   Oxygen Saturation Interpretation: Normal    The patients available past medical records and past encounters were reviewed.         ------------------------------ ED COURSE/MEDICAL DECISION MAKING----------------------  Medications - No data to display      ED COURSE:       Medical Decision Making:    W/u noted, will cx and tx with abx for dysuria/ pt non toxic, safe for outpt fu      This patient's ED course included: a personal history and physicial examination    This patient has remained hemodynamically stable during their ED course. Re-Evaluations:             Re-evaluation. Patients symptoms show no change            Counseling: The emergency provider has spoken with the patient and discussed todays results, in addition to providing specific details for the plan of care and counseling regarding the diagnosis and prognosis. Questions are answered at this time and they are agreeable with the plan.       --------------------------------- IMPRESSION AND DISPOSITION ---------------------------------    IMPRESSION  1. Dysuria    2. Flank pain        DISPOSITION  Disposition: Discharge to home  Patient condition is stable    NOTE: This report was transcribed using voice recognition software.  Every effort was made to ensure accuracy; however, inadvertent computerized transcription errors may be present        Dallas Lord MD  12/29/20 7346

## 2020-12-29 LAB — URINE CULTURE, ROUTINE: NORMAL

## 2021-04-05 ENCOUNTER — HOSPITAL ENCOUNTER (EMERGENCY)
Age: 41
Discharge: HOME | End: 2021-04-05
Payer: COMMERCIAL

## 2021-04-05 VITALS
TEMPERATURE: 96.8 F | WEIGHT: 160 LBS | SYSTOLIC BLOOD PRESSURE: 114 MMHG | DIASTOLIC BLOOD PRESSURE: 86 MMHG | RESPIRATION RATE: 18 BRPM | HEIGHT: 59 IN | HEART RATE: 82 BPM | BODY MASS INDEX: 32.25 KG/M2 | OXYGEN SATURATION: 99 %

## 2021-04-05 DIAGNOSIS — K08.89 PAIN, DENTAL: Primary | ICD-10-CM

## 2021-04-05 PROCEDURE — 6370000000 HC RX 637 (ALT 250 FOR IP): Performed by: NURSE PRACTITIONER

## 2021-04-05 PROCEDURE — 99282 EMERGENCY DEPT VISIT SF MDM: CPT

## 2021-04-05 RX ORDER — IBUPROFEN 800 MG/1
800 TABLET ORAL 2 TIMES DAILY PRN
Qty: 20 TABLET | Refills: 0 | Status: SHIPPED | OUTPATIENT
Start: 2021-04-05 | End: 2021-06-07

## 2021-04-05 RX ORDER — AMOXICILLIN 500 MG/1
500 CAPSULE ORAL 3 TIMES DAILY
Qty: 30 CAPSULE | Refills: 0 | Status: SHIPPED | OUTPATIENT
Start: 2021-04-05 | End: 2021-04-15

## 2021-04-05 RX ORDER — IBUPROFEN 800 MG/1
800 TABLET ORAL ONCE
Status: COMPLETED | OUTPATIENT
Start: 2021-04-05 | End: 2021-04-05

## 2021-04-05 RX ORDER — CHLORHEXIDINE GLUCONATE 0.12 MG/ML
15 RINSE ORAL 2 TIMES DAILY
Qty: 420 ML | Refills: 0 | Status: SHIPPED | OUTPATIENT
Start: 2021-04-05 | End: 2021-04-19

## 2021-04-05 RX ORDER — LIDOCAINE HYDROCHLORIDE 20 MG/ML
15 SOLUTION OROPHARYNGEAL
Qty: 100 ML | Refills: 0 | Status: SHIPPED | OUTPATIENT
Start: 2021-04-05 | End: 2021-06-07

## 2021-04-05 RX ADMIN — IBUPROFEN 800 MG: 800 TABLET, FILM COATED ORAL at 16:07

## 2021-04-05 ASSESSMENT — PAIN DESCRIPTION - LOCATION: LOCATION: TEETH

## 2021-04-05 ASSESSMENT — PAIN DESCRIPTION - ORIENTATION: ORIENTATION: LEFT;UPPER;LOWER

## 2021-04-05 ASSESSMENT — PAIN DESCRIPTION - PROGRESSION: CLINICAL_PROGRESSION: GRADUALLY WORSENING

## 2021-04-05 ASSESSMENT — PAIN DESCRIPTION - ONSET: ONSET: ON-GOING

## 2021-04-05 ASSESSMENT — PAIN SCALES - GENERAL: PAINLEVEL_OUTOF10: 9

## 2021-04-05 NOTE — LETTER
[address] [facility] Emergency Department  [id number] [facility] [address]  Phone: [phone]               April 5, 2021    Patient: Paula Fields   YOB: 1980   Date of Visit: 4/5/2021       To Whom It May Concern:    Paula Fields was seen and treated in our emergency department on 4/5/2021. She may return to work on 4/6/2021.       Sincerely,       JUANIS Enrique CNP         Signature:__________________________________

## 2021-04-06 NOTE — ED PROVIDER NOTES
[facility]  Department of Emergency Medicine   ED  Encounter Note  Admit Date/RoomTime: 2021  3:07 PM  ED Room:     NAME: Des Brenner  : 1980  MRN: [id number]     Chief Complaint:  Dental Pain (Started last week, upper and lower left side. Causing a headache. Cant get into dentist until Wednesday. )    History of Present Illness        Des Brenner is a 36 y.o. old female who presents to the emergency department by private vehicle, for non-traumatic right upper, right lower, left upper, left lower tooth pain, which occured several month(s) prior to arrival.  Since onset the symptoms have been intermittent and mild in severity. Worsened by  chewing and improved by nothing. Associated Signs & Symptoms:  Facial pain. Onset:       Spontaneous:   no.     Following Trauma:   no.     Previous Caries:   yes. Recent Dental Procedure:   no.     ROS   Pertinent positives and negatives are stated within HPI, all other systems reviewed and are negative. Past Medical History:  has a past medical history of Bronchitis and Dental caries. Surgical History:  has a past surgical history that includes Induced  (2012) and  section. Social History:  reports that she has never smoked. She has never used smokeless tobacco. She reports current drug use. Drug: Marijuana. She reports that she does not drink alcohol. Family History: family history is not on file. Allergies: Latex    Physical Exam   Oxygen Saturation Interpretation: Normal.        ED Triage Vitals   BP Temp Temp Source Pulse Resp SpO2 Height Weight   21 1459 21 1459 21 1459 21 1459 21 1459 21 1459 21 1512 21 1512   114/86 96.8 °F (36 °C) Temporal 82 18 99 % 4' 11\" (1.499 m) 160 lb (72.6 kg)         · Constitutional:  Alert, development consistent with age. · HEENT:  NC/NT. Airway patent. · Neck:  Supple.  Normal for 10 days, Disp-30 capsule, R-0Normal      chlorhexidine (PERIDEX) 0.12 % solution Take 15 mLs by mouth 2 times daily for 14 days, Disp-420 mL, R-0Normal           Electronically signed by JUANIS Wharton CNP   DD: 4/5/21  **This report was transcribed using voice recognition software. Every effort was made to ensure accuracy; however, inadvertent computerized transcription errors may be present.   END OF ED PROVIDER NOTE        JUANIS Strange CNP  04/05/21 2031

## 2021-06-07 ENCOUNTER — APPOINTMENT (OUTPATIENT)
Dept: GENERAL RADIOLOGY | Age: 41
End: 2021-06-07
Payer: COMMERCIAL

## 2021-06-07 ENCOUNTER — HOSPITAL ENCOUNTER (EMERGENCY)
Age: 41
Discharge: HOME | End: 2021-06-07
Attending: EMERGENCY MEDICINE
Payer: COMMERCIAL

## 2021-06-07 VITALS
OXYGEN SATURATION: 97 % | BODY MASS INDEX: 32.25 KG/M2 | TEMPERATURE: 97.4 F | HEART RATE: 104 BPM | WEIGHT: 160 LBS | RESPIRATION RATE: 16 BRPM | SYSTOLIC BLOOD PRESSURE: 122 MMHG | HEIGHT: 59 IN | DIASTOLIC BLOOD PRESSURE: 68 MMHG

## 2021-06-07 DIAGNOSIS — J18.9 PNEUMONIA OF RIGHT LUNG DUE TO INFECTIOUS ORGANISM, UNSPECIFIED PART OF LUNG: Primary | ICD-10-CM

## 2021-06-07 LAB — SARS-COV-2, NAAT: NOT DETECTED

## 2021-06-07 PROCEDURE — 87635 SARS-COV-2 COVID-19 AMP PRB: CPT

## 2021-06-07 PROCEDURE — 71045 X-RAY EXAM CHEST 1 VIEW: CPT

## 2021-06-07 PROCEDURE — 99283 EMERGENCY DEPT VISIT LOW MDM: CPT

## 2021-06-07 RX ORDER — BENZONATATE 100 MG/1
100 CAPSULE ORAL 3 TIMES DAILY PRN
Qty: 30 CAPSULE | Refills: 0 | Status: SHIPPED | OUTPATIENT
Start: 2021-06-07 | End: 2021-06-17

## 2021-06-07 RX ORDER — CEFDINIR 300 MG/1
300 CAPSULE ORAL 2 TIMES DAILY
Qty: 10 CAPSULE | Refills: 0 | Status: SHIPPED | OUTPATIENT
Start: 2021-06-07 | End: 2021-06-12

## 2021-06-07 RX ORDER — AZITHROMYCIN 250 MG/1
250 TABLET, FILM COATED ORAL SEE ADMIN INSTRUCTIONS
Qty: 6 TABLET | Refills: 0 | Status: SHIPPED | OUTPATIENT
Start: 2021-06-07 | End: 2021-06-12

## 2021-06-07 ASSESSMENT — PAIN DESCRIPTION - DESCRIPTORS: DESCRIPTORS: ACHING;CONSTANT;DISCOMFORT

## 2021-06-07 ASSESSMENT — PAIN DESCRIPTION - PAIN TYPE: TYPE: ACUTE PAIN

## 2021-06-07 ASSESSMENT — PAIN SCALES - GENERAL: PAINLEVEL_OUTOF10: 0

## 2021-06-07 ASSESSMENT — ENCOUNTER SYMPTOMS
SHORTNESS OF BREATH: 0
COUGH: 1
ABDOMINAL PAIN: 0
BACK PAIN: 0

## 2021-06-07 ASSESSMENT — PAIN DESCRIPTION - PROGRESSION: CLINICAL_PROGRESSION: GRADUALLY WORSENING

## 2021-06-07 ASSESSMENT — PAIN DESCRIPTION - ONSET: ONSET: GRADUAL

## 2021-06-07 ASSESSMENT — PAIN DESCRIPTION - FREQUENCY: FREQUENCY: CONTINUOUS

## 2021-06-07 ASSESSMENT — PAIN DESCRIPTION - LOCATION: LOCATION: HEAD;GENERALIZED

## 2021-06-07 NOTE — ED PROVIDER NOTES
This is a 59-year-old female with no significant past medical history who presents to the ED for evaluation of a cough. Patient states that for the past week she has been having sinus congestion as well as a dry cough. Patient states she is also been having associated body aches diffusely. She states she does have some trouble breathing specifically when he starts coughing states at rest she has no trouble catching her breath. Patient states that she has no chest pain leg pain leg swelling or hemoptysis. He has no fevers or chills. Patient states that her son who is here with her as a patient similar symptoms and despite taking Tylenol and over-the-counter cough medicine this does not seem to improve her symptoms. She has no history of DVTs. No  was used. Review of Systems   Constitutional: Negative for fever. HENT: Negative for congestion. Eyes: Negative for visual disturbance. Respiratory: Positive for cough. Negative for shortness of breath. Cardiovascular: Negative for chest pain. Gastrointestinal: Negative for abdominal pain. Endocrine: Negative for polyuria. Genitourinary: Negative for dysuria. Musculoskeletal: Negative for back pain. Skin: Negative for rash. Allergic/Immunologic: Negative for immunocompromised state. Neurological: Negative for headaches. Psychiatric/Behavioral: Negative for confusion. Physical Exam  Vitals and nursing note reviewed. Constitutional:       General: She is not in acute distress. Appearance: She is well-developed. HENT:      Head: Normocephalic and atraumatic. Mouth/Throat:      Mouth: Mucous membranes are moist.   Eyes:      Extraocular Movements: Extraocular movements intact. Pupils: Pupils are equal, round, and reactive to light. Neck:      Vascular: No JVD. Cardiovascular:      Rate and Rhythm: Normal rate and regular rhythm.    Pulmonary:      Effort: Pulmonary effort is normal. Breath sounds: No wheezing or rhonchi. Abdominal:      General: There is no distension. Palpations: Abdomen is soft. Tenderness: There is no abdominal tenderness. There is no guarding or rebound. Hernia: No hernia is present. Musculoskeletal:      Cervical back: Normal range of motion and neck supple. Right lower leg: No edema. Left lower leg: No edema. Skin:     General: Skin is warm and dry. Capillary Refill: Capillary refill takes less than 2 seconds. Neurological:      General: No focal deficit present. Mental Status: She is alert and oriented to person, place, and time. Cranial Nerves: No cranial nerve deficit. Psychiatric:         Behavior: Behavior normal.          Procedures     MDM  Number of Diagnoses or Management Options  Pneumonia of right lung due to infectious organism, unspecified part of lung  Diagnosis management comments: Patient presented to the ED for evaluation of a cough and SOB for one week. She was nontoxic and had a pulse oximetry over 97% on RA without any signs of respiratory distress. COvid was negative and imaging was concerning for a Right sided PNA. Patient was given antibiotics and was advised to follow up with her PCP. She was given strict return precautions.                   --------------------------------------------- PAST HISTORY ---------------------------------------------  Past Medical History:  has a past medical history of Bronchitis and Dental caries. Past Surgical History:  has a past surgical history that includes Induced  (2012);  section; and Tubal ligation. Social History:  reports that she has been smoking. She has never used smokeless tobacco. She reports current drug use. Drug: Marijuana. She reports that she does not drink alcohol. Family History: family history is not on file. The patients home medications have been reviewed.     Allergies: Latex    -------------------------------------------------- RESULTS -------------------------------------------------  Labs:  Results for orders placed or performed during the hospital encounter of 06/07/21   COVID-19, Rapid    Specimen: Nasopharyngeal Swab   Result Value Ref Range    SARS-CoV-2, NAAT Not Detected Not Detected       Radiology:  XR CHEST PORTABLE   Final Result   Ill-defined opacity in the periphery of the right mid lung concerning for   developing consolidative process from pneumonia. Continued follow-up   recommended. ------------------------- NURSING NOTES AND VITALS REVIEWED ---------------------------  Date / Time Roomed:  6/7/2021 11:10 AM  ED Bed Assignment:  04/04    The nursing notes within the ED encounter and vital signs as below have been reviewed. /68   Pulse 104   Temp 97.4 °F (36.3 °C) (Infrared)   Resp 16   Ht 4' 11\" (1.499 m)   Wt 160 lb (72.6 kg)   LMP 05/18/2021 (Approximate)   SpO2 97%   BMI 32.32 kg/m²   Oxygen Saturation Interpretation: Normal      ------------------------------------------ PROGRESS NOTES ------------------------------------------  12:11 PM EDT  I have spoken with the patient and discussed todays results, in addition to providing specific details for the plan of care and counseling regarding the diagnosis and prognosis. Their questions are answered at this time and they are agreeable with the plan. I discussed at length with them reasons for immediate return here for re evaluation. They will followup with their PCP.      --------------------------------- ADDITIONAL PROVIDER NOTES ---------------------------------  At this time the patient is without objective evidence of an acute process requiring hospitalization or inpatient management. They have remained hemodynamically stable throughout their entire ED visit and are stable for discharge with outpatient follow-up.      The plan has been discussed in detail and they are aware of the specific conditions for emergent return, as well as the importance of follow-up. Discharge Medication List as of 6/7/2021  1:39 PM      START taking these medications    Details   cefdinir (OMNICEF) 300 MG capsule Take 1 capsule by mouth 2 times daily for 5 days, Disp-10 capsule, R-0Print      azithromycin (ZITHROMAX) 250 MG tablet Take 1 tablet by mouth See Admin Instructions for 5 days 500mg on day 1 followed by 250mg on days 2 - 5, Disp-6 tablet, R-0Print      benzonatate (TESSALON) 100 MG capsule Take 1 capsule by mouth 3 times daily as needed for Cough, Disp-30 capsule, R-0Print             Diagnosis:  1. Pneumonia of right lung due to infectious organism, unspecified part of lung        Disposition:  Patient's disposition: Discharge to home  Patient's condition is stable.         Loren Reeves DO  06/07/21 2104

## 2021-06-10 ENCOUNTER — APPOINTMENT (OUTPATIENT)
Dept: GENERAL RADIOLOGY | Age: 41
End: 2021-06-10
Payer: COMMERCIAL

## 2021-06-10 ENCOUNTER — HOSPITAL ENCOUNTER (EMERGENCY)
Age: 41
Discharge: HOME | End: 2021-06-10
Payer: COMMERCIAL

## 2021-06-10 VITALS
HEART RATE: 96 BPM | DIASTOLIC BLOOD PRESSURE: 64 MMHG | WEIGHT: 160 LBS | BODY MASS INDEX: 32.25 KG/M2 | HEIGHT: 59 IN | SYSTOLIC BLOOD PRESSURE: 98 MMHG | TEMPERATURE: 98.3 F | OXYGEN SATURATION: 98 % | RESPIRATION RATE: 16 BRPM

## 2021-06-10 DIAGNOSIS — J18.9 PNEUMONIA OF RIGHT LOWER LOBE DUE TO INFECTIOUS ORGANISM: Primary | ICD-10-CM

## 2021-06-10 DIAGNOSIS — R05.9 COUGH: ICD-10-CM

## 2021-06-10 LAB
ALBUMIN SERPL-MCNC: 3.8 G/DL (ref 3.5–5.2)
ALP BLD-CCNC: 47 U/L (ref 35–104)
ALT SERPL-CCNC: 23 U/L (ref 0–32)
ANION GAP SERPL CALCULATED.3IONS-SCNC: 13 MMOL/L (ref 7–16)
AST SERPL-CCNC: 38 U/L (ref 0–31)
BASOPHILS ABSOLUTE: 0 E9/L (ref 0–0.2)
BASOPHILS RELATIVE PERCENT: 0 % (ref 0–2)
BILIRUB SERPL-MCNC: 0.4 MG/DL (ref 0–1.2)
BUN BLDV-MCNC: 10 MG/DL (ref 6–20)
CALCIUM SERPL-MCNC: 8.4 MG/DL (ref 8.6–10.2)
CHLORIDE BLD-SCNC: 103 MMOL/L (ref 98–107)
CO2: 20 MMOL/L (ref 22–29)
CREAT SERPL-MCNC: 1 MG/DL (ref 0.5–1)
EOSINOPHILS ABSOLUTE: 0 E9/L (ref 0.05–0.5)
EOSINOPHILS RELATIVE PERCENT: 0 % (ref 0–6)
GFR AFRICAN AMERICAN: >60
GFR NON-AFRICAN AMERICAN: >60 ML/MIN/1.73
GLUCOSE BLD-MCNC: 92 MG/DL (ref 74–99)
HCT VFR BLD CALC: 39.4 % (ref 34–48)
HEMOGLOBIN: 13.7 G/DL (ref 11.5–15.5)
IMMATURE GRANULOCYTES #: 0.01 E9/L
IMMATURE GRANULOCYTES %: 0.3 % (ref 0–5)
LACTIC ACID: 0.9 MMOL/L (ref 0.5–2.2)
LYMPHOCYTES ABSOLUTE: 1.36 E9/L (ref 1.5–4)
LYMPHOCYTES RELATIVE PERCENT: 45.8 % (ref 20–42)
MCH RBC QN AUTO: 30.7 PG (ref 26–35)
MCHC RBC AUTO-ENTMCNC: 34.8 % (ref 32–34.5)
MCV RBC AUTO: 88.3 FL (ref 80–99.9)
MONOCYTES ABSOLUTE: 0.26 E9/L (ref 0.1–0.95)
MONOCYTES RELATIVE PERCENT: 8.8 % (ref 2–12)
NEUTROPHILS ABSOLUTE: 1.34 E9/L (ref 1.8–7.3)
NEUTROPHILS RELATIVE PERCENT: 45.1 % (ref 43–80)
PDW BLD-RTO: 12.7 FL (ref 11.5–15)
PLATELET # BLD: 188 E9/L (ref 130–450)
PMV BLD AUTO: 10.3 FL (ref 7–12)
POTASSIUM SERPL-SCNC: 4 MMOL/L (ref 3.5–5)
RBC # BLD: 4.46 E12/L (ref 3.5–5.5)
SODIUM BLD-SCNC: 136 MMOL/L (ref 132–146)
TOTAL PROTEIN: 7.3 G/DL (ref 6.4–8.3)
WBC # BLD: 3 E9/L (ref 4.5–11.5)

## 2021-06-10 PROCEDURE — 71046 X-RAY EXAM CHEST 2 VIEWS: CPT

## 2021-06-10 PROCEDURE — 36415 COLL VENOUS BLD VENIPUNCTURE: CPT

## 2021-06-10 PROCEDURE — 85025 COMPLETE CBC W/AUTO DIFF WBC: CPT

## 2021-06-10 PROCEDURE — 6370000000 HC RX 637 (ALT 250 FOR IP): Performed by: PHYSICIAN ASSISTANT

## 2021-06-10 PROCEDURE — 99285 EMERGENCY DEPT VISIT HI MDM: CPT

## 2021-06-10 PROCEDURE — 80053 COMPREHEN METABOLIC PANEL: CPT

## 2021-06-10 PROCEDURE — 2580000003 HC RX 258: Performed by: PHYSICIAN ASSISTANT

## 2021-06-10 PROCEDURE — 83605 ASSAY OF LACTIC ACID: CPT

## 2021-06-10 RX ORDER — DEXTROMETHORPHAN HYDROBROMIDE AND PROMETHAZINE HYDROCHLORIDE 15; 6.25 MG/5ML; MG/5ML
5 SYRUP ORAL 4 TIMES DAILY PRN
Qty: 500 ML | Refills: 1 | Status: SHIPPED | OUTPATIENT
Start: 2021-06-10 | End: 2021-10-26

## 2021-06-10 RX ORDER — CODEINE PHOSPHATE AND GUAIFENESIN 10; 100 MG/5ML; MG/5ML
5 SOLUTION ORAL ONCE
Status: COMPLETED | OUTPATIENT
Start: 2021-06-10 | End: 2021-06-10

## 2021-06-10 RX ORDER — PREDNISONE 20 MG/1
40 TABLET ORAL DAILY
Qty: 10 TABLET | Refills: 0 | Status: SHIPPED | OUTPATIENT
Start: 2021-06-10 | End: 2021-06-15

## 2021-06-10 RX ORDER — ONDANSETRON 4 MG/1
4 TABLET, ORALLY DISINTEGRATING ORAL ONCE
Status: COMPLETED | OUTPATIENT
Start: 2021-06-10 | End: 2021-06-10

## 2021-06-10 RX ORDER — 0.9 % SODIUM CHLORIDE 0.9 %
1000 INTRAVENOUS SOLUTION INTRAVENOUS ONCE
Status: COMPLETED | OUTPATIENT
Start: 2021-06-10 | End: 2021-06-10

## 2021-06-10 RX ADMIN — ONDANSETRON 4 MG: 4 TABLET, ORALLY DISINTEGRATING ORAL at 10:38

## 2021-06-10 RX ADMIN — SODIUM CHLORIDE 1000 ML: 9 INJECTION, SOLUTION INTRAVENOUS at 11:31

## 2021-06-10 RX ADMIN — GUAIFENESIN AND CODEINE PHOSPHATE 5 ML: 10; 100 LIQUID ORAL at 10:37

## 2021-06-10 NOTE — ED PROVIDER NOTES
[facility]     Department of Emergency Medicine   ED  Provider Note  Admit Date/RoomTime: 6/10/2021  9:54 AM  ED Room:     Chief Complaint:   Nausea (states was seen here monday and diagnosed with pneumonia, states she still not feeling better, states still, cough, nauseated and weak. )    History of Present Illness      Eileen Escobar is a 36 y.o. old female who presents to the ED for generalized fatigue and nausea. Patient states she was seen here on Monday and diagnosed with pneumonia. She has been taking Omnicef and Z-Bruno. She states she is still feeling weak and is still coughing. She states she has been taking Tessalon Perles that were prescribed to her but they do not work. She denies any chest pain or worsening shortness of breath. Patient states she has some abdominal upset due to antibiotic use. She denies any fever, back pain, rash, limb pain or swelling, neck pain, headache, dizziness, or difficulty with ambulation or balance. She is alert and oriented x3 and in no apparent distress at this exam.  Patient is nontoxic-appearing. She is 98% on room air with unlabored breathing. Patient had negative Covid test 2 days ago. ROS   Pertinent positives and negatives are stated within HPI, all other systems reviewed and are negative. Past Medical History:  has a past medical history of Bronchitis and Dental caries. Past Surgical History:  has a past surgical history that includes Induced  (2012);  section; and Tubal ligation. Social History:  reports that she has been smoking. She has never used smokeless tobacco. She reports current drug use. Drug: Marijuana. She reports that she does not drink alcohol. Family History: family history is not on file. The patients home medications have been reviewed. Allergies: Latex  Allergies have been reviewed with patient.      Physical Exam   VS:  BP 98/64   Pulse 96   Temp 98.3 °F (36.8 °C) (Temporal)   Resp Sodium 136 132 - 146 mmol/L    Potassium 4.0 3.5 - 5.0 mmol/L    Chloride 103 98 - 107 mmol/L    CO2 20 (L) 22 - 29 mmol/L    Anion Gap 13 7 - 16 mmol/L    Glucose 92 74 - 99 mg/dL    BUN 10 6 - 20 mg/dL    CREATININE 1.0 0.5 - 1.0 mg/dL    GFR Non-African American >60 >=60 mL/min/1.73    GFR African American >60     Calcium 8.4 (L) 8.6 - 10.2 mg/dL    Total Protein 7.3 6.4 - 8.3 g/dL    Albumin 3.8 3.5 - 5.2 g/dL    Total Bilirubin 0.4 0.0 - 1.2 mg/dL    Alkaline Phosphatase 47 35 - 104 U/L    ALT 23 0 - 32 U/L    AST 38 (H) 0 - 31 U/L   Lactic Acid, Plasma   Result Value Ref Range    Lactic Acid 0.9 0.5 - 2.2 mmol/L     Imaging: All Radiology results interpreted by Radiologist unless otherwise noted. XR CHEST (2 VW)   Final Result   Mild worsening of right upper lobe infiltrate           ED Course / Medical Decision Making   ED Medications:   Medications   0.9 % sodium chloride bolus (1,000 mLs Intravenous New Bag 6/10/21 1131)   guaiFENesin-codeine (GUAIFENESIN AC) 100-10 MG/5ML liquid 5 mL (5 mLs Oral Given 6/10/21 1037)   ondansetron (ZOFRAN-ODT) disintegrating tablet 4 mg (4 mg Oral Given 6/10/21 1038)     Consults:  None    Procedures:  None    Re-examination:  Patients symptoms are improving. Patient's pulse ox with ambulation was 96%  Patient is resting comfortably in bed with no distress. She has no chest pain or shortness of breath. She has unlabored respirations and remains 98% on room air. Patient is talking and laughing on her phone with no difficulty. Medical Decision Making:   Patient is well appearing, non toxic and appropriate for outpatient management. Plan is for symptom management and PCP follow up. Counseling: The emergency provider has spoken with the patient and/or caregiver and discussed todays results, in addition to providing specific details for the plan of care and counseling regarding the diagnosis and prognosis.   Questions are answered at this time and they are agreeable with the plan. All results reviewed with pt and all questions answered. Patient is nontoxic-appearing. She is nonhypoxic. She remains 90% on room air. I discussed the differential, results and discharge plan with the patient and/or family/friend/caregiver if present. I emphasized the importance of follow-up with the physician I referred them to in the timeframe recommended. I explained reasons for the patient to return to the Emergency Department. Additional verbal discharge instructions were also given and discussed with the patient to supplement those generated by the EMR. We also discussed medications that were prescribed (if any) including common side effects and interactions. . All questions were addressed. They understand return precautions and discharge instructions. The patient and/or family/friend/caregiver expressed understanding. Vitals were stable and they were in no distress at discharge. Patient advised to continue Omnicef and finish Z pack. Assessment     1. Pneumonia of right lower lobe due to infectious organism    2. Cough      Plan   Discharge to home  Patient condition is good    New Medications     New Prescriptions    ALBUTEROL-IPRATROPIUM (COMBIVENT RESPIMAT)  MCG/ACT AERS INHALER    Inhale 1 puff into the lungs every 4 hours as needed for Wheezing    PREDNISONE (DELTASONE) 20 MG TABLET    Take 2 tablets by mouth daily for 5 days    PROMETHAZINE-DEXTROMETHORPHAN (PROMETHAZINE-DM) 6.25-15 MG/5ML SYRUP    Take 5 mLs by mouth 4 times daily as needed for Cough       Electronically signed by Meg Barragan PA-C   DD: 6/10/21  **This report was transcribed using voice recognition software. Every effort was made to ensure accuracy; however, inadvertent computerized transcription errors may be present.     END OF ED PROVIDER NOTE         Meg Barragan PA-C  06/10/21 7068

## 2021-10-26 ENCOUNTER — HOSPITAL ENCOUNTER (EMERGENCY)
Age: 41
Discharge: HOME | End: 2021-10-26
Payer: COMMERCIAL

## 2021-10-26 ENCOUNTER — APPOINTMENT (OUTPATIENT)
Dept: ULTRASOUND IMAGING | Age: 41
End: 2021-10-26
Payer: COMMERCIAL

## 2021-10-26 ENCOUNTER — APPOINTMENT (OUTPATIENT)
Dept: CT IMAGING | Age: 41
End: 2021-10-26
Payer: COMMERCIAL

## 2021-10-26 VITALS
DIASTOLIC BLOOD PRESSURE: 71 MMHG | OXYGEN SATURATION: 100 % | BODY MASS INDEX: 32.05 KG/M2 | HEIGHT: 59 IN | RESPIRATION RATE: 16 BRPM | TEMPERATURE: 97.7 F | HEART RATE: 64 BPM | WEIGHT: 159 LBS | SYSTOLIC BLOOD PRESSURE: 121 MMHG

## 2021-10-26 DIAGNOSIS — M79.602 LEFT ARM PAIN: Primary | ICD-10-CM

## 2021-10-26 DIAGNOSIS — M79.10 MYALGIA: ICD-10-CM

## 2021-10-26 DIAGNOSIS — M79.605 LEFT LEG PAIN: ICD-10-CM

## 2021-10-26 LAB
ALBUMIN SERPL-MCNC: 4.2 G/DL (ref 3.5–5.2)
ALP BLD-CCNC: 50 U/L (ref 35–104)
ALT SERPL-CCNC: 14 U/L (ref 0–32)
ANION GAP SERPL CALCULATED.3IONS-SCNC: 9 MMOL/L (ref 7–16)
AST SERPL-CCNC: 20 U/L (ref 0–31)
BASOPHILS ABSOLUTE: 0.02 E9/L (ref 0–0.2)
BASOPHILS RELATIVE PERCENT: 0.4 % (ref 0–2)
BILIRUB SERPL-MCNC: 0.2 MG/DL (ref 0–1.2)
BILIRUBIN URINE: NEGATIVE
BLOOD, URINE: NEGATIVE
BUN BLDV-MCNC: 11 MG/DL (ref 6–20)
CALCIUM SERPL-MCNC: 9.4 MG/DL (ref 8.6–10.2)
CHLORIDE BLD-SCNC: 105 MMOL/L (ref 98–107)
CLARITY: CLEAR
CO2: 23 MMOL/L (ref 22–29)
COLOR: YELLOW
CREAT SERPL-MCNC: 0.9 MG/DL (ref 0.5–1)
EOSINOPHILS ABSOLUTE: 0.04 E9/L (ref 0.05–0.5)
EOSINOPHILS RELATIVE PERCENT: 0.8 % (ref 0–6)
GFR AFRICAN AMERICAN: >60
GFR NON-AFRICAN AMERICAN: >60 ML/MIN/1.73
GLUCOSE BLD-MCNC: 88 MG/DL (ref 74–99)
GLUCOSE URINE: NEGATIVE MG/DL
HCG(URINE) PREGNANCY TEST: NEGATIVE
HCT VFR BLD CALC: 38.4 % (ref 34–48)
HEMOGLOBIN: 12.8 G/DL (ref 11.5–15.5)
IMMATURE GRANULOCYTES #: 0.01 E9/L
IMMATURE GRANULOCYTES %: 0.2 % (ref 0–5)
KETONES, URINE: NEGATIVE MG/DL
LEUKOCYTE ESTERASE, URINE: NEGATIVE
LYMPHOCYTES ABSOLUTE: 2.09 E9/L (ref 1.5–4)
LYMPHOCYTES RELATIVE PERCENT: 43.4 % (ref 20–42)
MCH RBC QN AUTO: 30.1 PG (ref 26–35)
MCHC RBC AUTO-ENTMCNC: 33.3 % (ref 32–34.5)
MCV RBC AUTO: 90.4 FL (ref 80–99.9)
MONOCYTES ABSOLUTE: 0.42 E9/L (ref 0.1–0.95)
MONOCYTES RELATIVE PERCENT: 8.7 % (ref 2–12)
NEUTROPHILS ABSOLUTE: 2.24 E9/L (ref 1.8–7.3)
NEUTROPHILS RELATIVE PERCENT: 46.5 % (ref 43–80)
NITRITE, URINE: NEGATIVE
PDW BLD-RTO: 12.5 FL (ref 11.5–15)
PH UA: 7 (ref 5–9)
PLATELET # BLD: 227 E9/L (ref 130–450)
PMV BLD AUTO: 10.4 FL (ref 7–12)
POTASSIUM REFLEX MAGNESIUM: 3.8 MMOL/L (ref 3.5–5)
PROTEIN UA: NEGATIVE MG/DL
RBC # BLD: 4.25 E12/L (ref 3.5–5.5)
SEDIMENTATION RATE, ERYTHROCYTE: 9 MM/HR (ref 0–20)
SODIUM BLD-SCNC: 137 MMOL/L (ref 132–146)
SPECIFIC GRAVITY UA: 1.02 (ref 1–1.03)
TOTAL PROTEIN: 7.4 G/DL (ref 6.4–8.3)
TROPONIN, HIGH SENSITIVITY: <6 NG/L (ref 0–9)
UROBILINOGEN, URINE: 0.2 E.U./DL
WBC # BLD: 4.8 E9/L (ref 4.5–11.5)

## 2021-10-26 PROCEDURE — 84484 ASSAY OF TROPONIN QUANT: CPT

## 2021-10-26 PROCEDURE — 93971 EXTREMITY STUDY: CPT

## 2021-10-26 PROCEDURE — 81025 URINE PREGNANCY TEST: CPT

## 2021-10-26 PROCEDURE — 99283 EMERGENCY DEPT VISIT LOW MDM: CPT

## 2021-10-26 PROCEDURE — 85651 RBC SED RATE NONAUTOMATED: CPT

## 2021-10-26 PROCEDURE — 85025 COMPLETE CBC W/AUTO DIFF WBC: CPT

## 2021-10-26 PROCEDURE — 81003 URINALYSIS AUTO W/O SCOPE: CPT

## 2021-10-26 PROCEDURE — 80053 COMPREHEN METABOLIC PANEL: CPT

## 2021-10-26 PROCEDURE — 70450 CT HEAD/BRAIN W/O DYE: CPT

## 2021-10-26 PROCEDURE — 93005 ELECTROCARDIOGRAM TRACING: CPT | Performed by: PHYSICIAN ASSISTANT

## 2021-10-26 ASSESSMENT — PAIN DESCRIPTION - LOCATION: LOCATION: ARM

## 2021-10-26 ASSESSMENT — PAIN DESCRIPTION - ORIENTATION: ORIENTATION: LEFT

## 2021-10-26 ASSESSMENT — PAIN DESCRIPTION - DESCRIPTORS: DESCRIPTORS: ACHING

## 2021-10-26 ASSESSMENT — PAIN SCALES - GENERAL: PAINLEVEL_OUTOF10: 8

## 2021-10-26 ASSESSMENT — PAIN DESCRIPTION - PAIN TYPE: TYPE: ACUTE PAIN

## 2021-10-26 ASSESSMENT — PAIN DESCRIPTION - FREQUENCY: FREQUENCY: CONTINUOUS

## 2021-10-26 NOTE — ED PROVIDER NOTES
[facility]                                                                                                                                    Department of Emergency Medicine   ED  Provider Note  Admit Date/RoomTime: 10/26/2021  9:12 AM  ED Room: 33/33        HPI:  10/26/21,   Time: 10:04 AM EDT         Sulema Rogers is a 36 y.o. female presenting to the ED for pain in left axilla, left UE, left leg, bilateral groin, beginning 1 year ago but worse recently. .  The complaint has been intermittent, moderate in severity, and worsened by nothing. The patient is an otherwise healthy woman. She has HIV listed on her problem list but tells me that she had some mixed lab results and went to see an infectious disease specialist and was told that she is not positive for HIV. She is here today because of pain in her left axillary region that she states radiates to the left upper arm and her left breast.  She states that it is sharp, shooting and intermittent pain that lately has been more persistent. She is also having some pain in her left medial upper thigh, both groins and her right breast.  She states that she has seen her PCP as well as her gynecologist for the same. She has had a mammogram.  There is no swelling, redness or rash. The patient denies fever or chills. She states that no one seems to be able to figure out what is going on. All of the tests she has had done thus far have been normal.  She states that she is right-handed. 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 There is been no fall, injury or trauma. Again there is no redness or rash. No report of swelling. Denies history of blood clots. She has never palpated any lumps though she was concerned about her lymph nodes. No history of rheumatologic diagnoses. Unsure if she has had any testing.         ROS:     Constitutional: Negative for fever and chills  HENT: Negative for ear pain, sore throat and sinus Neutrophils Absolute 2.24 1.80 - 7.30 E9/L    Immature Granulocytes # 0.01 E9/L    Lymphocytes Absolute 2.09 1.50 - 4.00 E9/L    Monocytes Absolute 0.42 0.10 - 0.95 E9/L    Eosinophils Absolute 0.04 (L) 0.05 - 0.50 E9/L    Basophils Absolute 0.02 0.00 - 0.20 E9/L   Comprehensive Metabolic Panel w/ Reflex to MG   Result Value Ref Range    Sodium 137 132 - 146 mmol/L    Potassium reflex Magnesium 3.8 3.5 - 5.0 mmol/L    Chloride 105 98 - 107 mmol/L    CO2 23 22 - 29 mmol/L    Anion Gap 9 7 - 16 mmol/L    Glucose 88 74 - 99 mg/dL    BUN 11 6 - 20 mg/dL    CREATININE 0.9 0.5 - 1.0 mg/dL    GFR Non-African American >60 >=60 mL/min/1.73    GFR African American >60     Calcium 9.4 8.6 - 10.2 mg/dL    Total Protein 7.4 6.4 - 8.3 g/dL    Albumin 4.2 3.5 - 5.2 g/dL    Total Bilirubin 0.2 0.0 - 1.2 mg/dL    Alkaline Phosphatase 50 35 - 104 U/L    ALT 14 0 - 32 U/L    AST 20 0 - 31 U/L   Urinalysis   Result Value Ref Range    Color, UA Yellow Straw/Yellow    Clarity, UA Clear Clear    Glucose, Ur Negative Negative mg/dL    Bilirubin Urine Negative Negative    Ketones, Urine Negative Negative mg/dL    Specific Gravity, UA 1.020 1.005 - 1.030    Blood, Urine Negative Negative    pH, UA 7.0 5.0 - 9.0    Protein, UA Negative Negative mg/dL    Urobilinogen, Urine 0.2 <2.0 E.U./dL    Nitrite, Urine Negative Negative    Leukocyte Esterase, Urine Negative Negative   Sedimentation Rate   Result Value Ref Range    Sed Rate 9 0 - 20 mm/Hr   Troponin   Result Value Ref Range    Troponin, High Sensitivity <6 0 - 9 ng/L   Pregnancy, Urine   Result Value Ref Range    HCG(Urine) Pregnancy Test NEGATIVE NEGATIVE   EKG 12 Lead   Result Value Ref Range    Ventricular Rate 70 BPM    Atrial Rate 70 BPM    P-R Interval 156 ms    QRS Duration 74 ms    Q-T Interval 394 ms    QTc Calculation (Bazett) 425 ms    P Axis 47 degrees    R Axis 8 degrees    T Axis 21 degrees       RADIOLOGY:  Interpreted by Radiologist.  CT HEAD WO CONTRAST   Final Result No acute intracranial abnormality. Specifically, there is no acute   intracranial hemorrhage         US DUP UPPER EXTREMITY LEFT VENOUS   Final Result   No evidence of DVT.             ----------------- NURSING NOTES AND VITALS REVIEWED ---------------   The nursing notes within the ED encounter and vital signs as below have been reviewed. /71   Pulse 64   Temp 97.7 °F (36.5 °C) (Oral)   Resp 16   Ht 4' 11\" (1.499 m)   Wt 159 lb (72.1 kg)   LMP 10/08/2021   SpO2 100%   BMI 32.11 kg/m²   Oxygen Saturation Interpretation: Normal      --------------------------------PHYSICAL EXAM------------------------------------      Constitutional/General: Alert and oriented x3, well appearing, non toxic in NAD  Head: NC/AT  Eyes: PERRL, EOMI  Mouth: Oropharynx clear, handling secretions, no trismus  Neck: Supple, full ROM, no meningeal signs  Pulmonary: Lungs clear to auscultation bilaterally, no wheezes, rales, or rhonchi. Not in respiratory distress  Cardiovascular:  Regular rate and rhythm, no murmurs, gallops, or rubs. 2+ distal pulses  Chest/Breasts:   No visible swelling, redness or rash. No palpable lymphadenopathy either axillary region or breast.     Abdomen: Soft, + BS. No distension. Nontender. No palpable rigidity, rebound or guarding  Extremities: Moves all extremities x 4. Warm and well perfused  Skin: warm and dry without rash  Neurologic: GCS 15,  Intact. No focal deficits  Psych: Normal Affect      ------------------------ ED COURSE/MEDICAL DECISION MAKING----------------------  Medications - No data to display      Medical Decision Making:    Patient's work-up is within normal limits. The CAT scan of the head is normal and ultrasound of the left upper extremity negative for DVT. Patient is reassured. I really cannot come up with one cohesive diagnosis to explain all her symptoms but certainly we have ruled out some concerning things today.   She really needs to discuss this further with her PCP. Consider further work-up for any persistent or ongoing symptoms. Motrin as needed for home       Counseling: The emergency provider has spoken with the patient and discussed todays results, in addition to providing specific details for the plan of care and counseling regarding the diagnosis and prognosis. Questions are answered at this time and they are agreeable with the plan.      ------------------------ IMPRESSION AND DISPOSITION -------------------------------    IMPRESSION  1. Left arm pain    2. Left leg pain    3.  Myalgia        DISPOSITION  Disposition: Discharge to home  Patient condition is stable                   Jorge Luis Osborne PA-C  10/26/21 2546

## 2021-10-27 LAB
EKG ATRIAL RATE: 70 BPM
EKG P AXIS: 47 DEGREES
EKG P-R INTERVAL: 156 MS
EKG Q-T INTERVAL: 394 MS
EKG QRS DURATION: 74 MS
EKG QTC CALCULATION (BAZETT): 425 MS
EKG R AXIS: 8 DEGREES
EKG T AXIS: 21 DEGREES
EKG VENTRICULAR RATE: 70 BPM

## 2021-10-27 PROCEDURE — 93010 ELECTROCARDIOGRAM REPORT: CPT | Performed by: INTERNAL MEDICINE

## 2021-11-27 ENCOUNTER — HOSPITAL ENCOUNTER (EMERGENCY)
Age: 41
Discharge: HOME | End: 2021-11-27
Attending: STUDENT IN AN ORGANIZED HEALTH CARE EDUCATION/TRAINING PROGRAM
Payer: COMMERCIAL

## 2021-11-27 ENCOUNTER — APPOINTMENT (OUTPATIENT)
Dept: CT IMAGING | Age: 41
End: 2021-11-27
Payer: COMMERCIAL

## 2021-11-27 VITALS
WEIGHT: 160.4 LBS | RESPIRATION RATE: 16 BRPM | OXYGEN SATURATION: 98 % | HEART RATE: 68 BPM | BODY MASS INDEX: 32.4 KG/M2 | SYSTOLIC BLOOD PRESSURE: 133 MMHG | TEMPERATURE: 97.5 F | DIASTOLIC BLOOD PRESSURE: 62 MMHG

## 2021-11-27 DIAGNOSIS — I88.9 CERVICAL LYMPHADENITIS: Primary | ICD-10-CM

## 2021-11-27 LAB
ALBUMIN SERPL-MCNC: 3.9 G/DL (ref 3.5–5.2)
ALP BLD-CCNC: 58 U/L (ref 35–104)
ALT SERPL-CCNC: 16 U/L (ref 0–32)
ANION GAP SERPL CALCULATED.3IONS-SCNC: 9 MMOL/L (ref 7–16)
AST SERPL-CCNC: 19 U/L (ref 0–31)
BASOPHILS ABSOLUTE: 0.01 E9/L (ref 0–0.2)
BASOPHILS RELATIVE PERCENT: 0.2 % (ref 0–2)
BILIRUB SERPL-MCNC: 0.2 MG/DL (ref 0–1.2)
BUN BLDV-MCNC: 11 MG/DL (ref 6–20)
CALCIUM SERPL-MCNC: 9.1 MG/DL (ref 8.6–10.2)
CHLORIDE BLD-SCNC: 110 MMOL/L (ref 98–107)
CO2: 22 MMOL/L (ref 22–29)
CREAT SERPL-MCNC: 1 MG/DL (ref 0.5–1)
EOSINOPHILS ABSOLUTE: 0.05 E9/L (ref 0.05–0.5)
EOSINOPHILS RELATIVE PERCENT: 1 % (ref 0–6)
GFR AFRICAN AMERICAN: >60
GFR NON-AFRICAN AMERICAN: >60 ML/MIN/1.73
GLUCOSE BLD-MCNC: 98 MG/DL (ref 74–99)
HCT VFR BLD CALC: 36.5 % (ref 34–48)
HEMOGLOBIN: 12.3 G/DL (ref 11.5–15.5)
IMMATURE GRANULOCYTES #: 0.01 E9/L
IMMATURE GRANULOCYTES %: 0.2 % (ref 0–5)
LYMPHOCYTES ABSOLUTE: 2.32 E9/L (ref 1.5–4)
LYMPHOCYTES RELATIVE PERCENT: 46.3 % (ref 20–42)
MCH RBC QN AUTO: 30.2 PG (ref 26–35)
MCHC RBC AUTO-ENTMCNC: 33.7 % (ref 32–34.5)
MCV RBC AUTO: 89.7 FL (ref 80–99.9)
MONOCYTES ABSOLUTE: 0.5 E9/L (ref 0.1–0.95)
MONOCYTES RELATIVE PERCENT: 10 % (ref 2–12)
NEUTROPHILS ABSOLUTE: 2.12 E9/L (ref 1.8–7.3)
NEUTROPHILS RELATIVE PERCENT: 42.3 % (ref 43–80)
PDW BLD-RTO: 12.3 FL (ref 11.5–15)
PLATELET # BLD: 245 E9/L (ref 130–450)
PMV BLD AUTO: 10.2 FL (ref 7–12)
POTASSIUM REFLEX MAGNESIUM: 4 MMOL/L (ref 3.5–5)
RBC # BLD: 4.07 E12/L (ref 3.5–5.5)
SODIUM BLD-SCNC: 141 MMOL/L (ref 132–146)
TOTAL PROTEIN: 7 G/DL (ref 6.4–8.3)
WBC # BLD: 5 E9/L (ref 4.5–11.5)

## 2021-11-27 PROCEDURE — 6360000002 HC RX W HCPCS: Performed by: STUDENT IN AN ORGANIZED HEALTH CARE EDUCATION/TRAINING PROGRAM

## 2021-11-27 PROCEDURE — 6370000000 HC RX 637 (ALT 250 FOR IP): Performed by: STUDENT IN AN ORGANIZED HEALTH CARE EDUCATION/TRAINING PROGRAM

## 2021-11-27 PROCEDURE — 96374 THER/PROPH/DIAG INJ IV PUSH: CPT

## 2021-11-27 PROCEDURE — 2580000003 HC RX 258: Performed by: STUDENT IN AN ORGANIZED HEALTH CARE EDUCATION/TRAINING PROGRAM

## 2021-11-27 PROCEDURE — 99284 EMERGENCY DEPT VISIT MOD MDM: CPT

## 2021-11-27 PROCEDURE — 80053 COMPREHEN METABOLIC PANEL: CPT

## 2021-11-27 PROCEDURE — 85025 COMPLETE CBC W/AUTO DIFF WBC: CPT

## 2021-11-27 PROCEDURE — 6360000004 HC RX CONTRAST MEDICATION: Performed by: STUDENT IN AN ORGANIZED HEALTH CARE EDUCATION/TRAINING PROGRAM

## 2021-11-27 PROCEDURE — 36415 COLL VENOUS BLD VENIPUNCTURE: CPT

## 2021-11-27 PROCEDURE — 70491 CT SOFT TISSUE NECK W/DYE: CPT

## 2021-11-27 RX ORDER — AMOXICILLIN AND CLAVULANATE POTASSIUM 875; 125 MG/1; MG/1
1 TABLET, FILM COATED ORAL ONCE
Status: COMPLETED | OUTPATIENT
Start: 2021-11-27 | End: 2021-11-27

## 2021-11-27 RX ORDER — SODIUM CHLORIDE 0.9 % (FLUSH) 0.9 %
10 SYRINGE (ML) INJECTION PRN
Status: COMPLETED | OUTPATIENT
Start: 2021-11-27 | End: 2021-11-27

## 2021-11-27 RX ORDER — AMOXICILLIN AND CLAVULANATE POTASSIUM 875; 125 MG/1; MG/1
1 TABLET, FILM COATED ORAL 2 TIMES DAILY
Qty: 14 TABLET | Refills: 0 | Status: SHIPPED | OUTPATIENT
Start: 2021-11-27 | End: 2021-12-04

## 2021-11-27 RX ORDER — KETOROLAC TROMETHAMINE 30 MG/ML
15 INJECTION, SOLUTION INTRAMUSCULAR; INTRAVENOUS ONCE
Status: COMPLETED | OUTPATIENT
Start: 2021-11-27 | End: 2021-11-27

## 2021-11-27 RX ADMIN — IOPAMIDOL 75 ML: 755 INJECTION, SOLUTION INTRAVENOUS at 04:21

## 2021-11-27 RX ADMIN — SODIUM CHLORIDE, PRESERVATIVE FREE 10 ML: 5 INJECTION INTRAVENOUS at 04:21

## 2021-11-27 RX ADMIN — AMOXICILLIN AND CLAVULANATE POTASSIUM 1 TABLET: 875; 125 TABLET, FILM COATED ORAL at 04:58

## 2021-11-27 RX ADMIN — KETOROLAC TROMETHAMINE 15 MG: 30 INJECTION, SOLUTION INTRAMUSCULAR; INTRAVENOUS at 03:22

## 2021-11-27 ASSESSMENT — PAIN SCALES - GENERAL
PAINLEVEL_OUTOF10: 4
PAINLEVEL_OUTOF10: 8
PAINLEVEL_OUTOF10: 8

## 2021-11-27 ASSESSMENT — PAIN DESCRIPTION - ORIENTATION: ORIENTATION: LEFT

## 2021-11-27 ASSESSMENT — PAIN DESCRIPTION - PAIN TYPE: TYPE: ACUTE PAIN

## 2021-11-27 ASSESSMENT — PAIN DESCRIPTION - DESCRIPTORS: DESCRIPTORS: STABBING;THROBBING

## 2021-11-27 ASSESSMENT — ENCOUNTER SYMPTOMS
SORE THROAT: 0
SHORTNESS OF BREATH: 0
COUGH: 0
DIARRHEA: 0
VOMITING: 0
PHOTOPHOBIA: 0
CHEST TIGHTNESS: 0
VOICE CHANGE: 0
ABDOMINAL PAIN: 0
ABDOMINAL DISTENTION: 0
BACK PAIN: 0
NAUSEA: 0
TROUBLE SWALLOWING: 0

## 2021-11-27 ASSESSMENT — PAIN DESCRIPTION - FREQUENCY: FREQUENCY: INTERMITTENT

## 2021-11-27 ASSESSMENT — PAIN DESCRIPTION - LOCATION: LOCATION: NECK

## 2021-11-27 NOTE — ED PROVIDER NOTES
Manuelito Gonsalez is a 36year old female without significant PMH who presented to ED with concern for neck pain. Patient has had 1 week of slowly progressively worsening neck pain. Patient stated that she has been feeling pain in her anterior neck and has felt small nodules. Some of the nodules are nontender some are tender. Patient is not have any difficulty speaking or swallowing. Patient is tolerating normal diet. Patient is having mild pain that does not radiate. Nothing make symptoms better or worse patient not tried any for symptoms. Patient is afebrile. Patient denies any sick contacts or any previous history of head neck cancer. Patient does smoke marijuana patient denies any tobacco use. Symptoms are constant mild in severity present for 1 week and are worsening. Patient's medical records have her listed as having a past medical history of HIV however patient denies this and states that it was a mistake. The history is provided by the patient and medical records. Review of Systems   Constitutional: Negative for chills, diaphoresis, fatigue and fever. HENT: Negative for sore throat, tinnitus, trouble swallowing and voice change. Eyes: Negative for photophobia and visual disturbance. Respiratory: Negative for cough, chest tightness and shortness of breath. Cardiovascular: Negative for chest pain, palpitations and leg swelling. Gastrointestinal: Negative for abdominal distention, abdominal pain, diarrhea, nausea and vomiting. Genitourinary: Negative for dysuria. Musculoskeletal: Negative for back pain, neck pain and neck stiffness. Skin: Negative for pallor and rash. Neurological: Negative for headaches. Psychiatric/Behavioral: Negative for confusion. Physical Exam  Vitals and nursing note reviewed. Constitutional:       General: She is not in acute distress. Appearance: Normal appearance. She is not ill-appearing.    HENT:      Head: Normocephalic and atraumatic. Eyes:      General: No scleral icterus. Conjunctiva/sclera: Conjunctivae normal.      Pupils: Pupils are equal, round, and reactive to light. Neck:      Comments: No meningeal signs normal range of motion to neck, tender bilateral cervical adenopathy also nontender cervical adenopathy all mobile  Cardiovascular:      Rate and Rhythm: Normal rate and regular rhythm. Pulmonary:      Effort: Pulmonary effort is normal.      Breath sounds: Normal breath sounds. Abdominal:      General: Bowel sounds are normal. There is no distension. Palpations: Abdomen is soft. Tenderness: There is no abdominal tenderness. There is no guarding or rebound. Musculoskeletal:      Cervical back: Normal range of motion and neck supple. Tenderness present. No rigidity. No muscular tenderness. Right lower leg: No edema. Left lower leg: No edema. Lymphadenopathy:      Cervical: Cervical adenopathy present. Skin:     General: Skin is warm and dry. Capillary Refill: Capillary refill takes less than 2 seconds. Coloration: Skin is not pale. Findings: No erythema or rash. Neurological:      Mental Status: She is alert and oriented to person, place, and time. Psychiatric:         Mood and Affect: Mood normal.          Procedures     MDM  Number of Diagnoses or Management Options  Cervical lymphadenitis  Diagnosis management comments: Freedom Otoole is a 80-year-old female present emergency department concern for neck pain. Patient is having anterior neck pain with swelling. Patient did have tender cervical adenopathy on exam.  Patient will be started on antibiotics for possible infection because of symptoms. CT scan was unremarkable for any acute process. Patient does not have any difficulty swallowing or speaking. Patient not have any meningeal signs on exam was afebrile nontoxic in appearance. Patient given Toradol with some improvement of symptoms.   Patient advised take NSAIDs at home and return to emergency department if symptoms worsen or return patient is agreeable to plan. Patient was advised that if symptoms do not resolve with antibiotics that she will likely need further evaluation. Patient advised to call her PCP to arrange follow-up appointment.            --------------------------------------------- PAST HISTORY ---------------------------------------------  Past Medical History:  has a past medical history of Bronchitis and Dental caries. Past Surgical History:  has a past surgical history that includes Induced  (2012);  section; and Tubal ligation. Social History:  reports that she has quit smoking. She has never used smokeless tobacco. She reports current drug use. Drug: Marijuana Forrest Plascencia). She reports that she does not drink alcohol. Family History: family history is not on file. The patients home medications have been reviewed.     Allergies: Latex    -------------------------------------------------- RESULTS -------------------------------------------------  Labs:  Results for orders placed or performed during the hospital encounter of 21   CBC auto differential   Result Value Ref Range    WBC 5.0 4.5 - 11.5 E9/L    RBC 4.07 3.50 - 5.50 E12/L    Hemoglobin 12.3 11.5 - 15.5 g/dL    Hematocrit 36.5 34.0 - 48.0 %    MCV 89.7 80.0 - 99.9 fL    MCH 30.2 26.0 - 35.0 pg    MCHC 33.7 32.0 - 34.5 %    RDW 12.3 11.5 - 15.0 fL    Platelets 134 185 - 300 E9/L    MPV 10.2 7.0 - 12.0 fL    Neutrophils % 42.3 (L) 43.0 - 80.0 %    Immature Granulocytes % 0.2 0.0 - 5.0 %    Lymphocytes % 46.3 (H) 20.0 - 42.0 %    Monocytes % 10.0 2.0 - 12.0 %    Eosinophils % 1.0 0.0 - 6.0 %    Basophils % 0.2 0.0 - 2.0 %    Neutrophils Absolute 2.12 1.80 - 7.30 E9/L    Immature Granulocytes # 0.01 E9/L    Lymphocytes Absolute 2.32 1.50 - 4.00 E9/L    Monocytes Absolute 0.50 0.10 - 0.95 E9/L    Eosinophils Absolute 0.05 0.05 - 0.50 E9/L    Basophils Absolute requiring hospitalization or inpatient management. They have remained hemodynamically stable throughout their entire ED visit and are stable for discharge with outpatient follow-up. The plan has been discussed in detail and they are aware of the specific conditions for emergent return, as well as the importance of follow-up. Discharge Medication List as of 11/27/2021  4:46 AM      START taking these medications    Details   amoxicillin-clavulanate (AUGMENTIN) 875-125 MG per tablet Take 1 tablet by mouth 2 times daily for 7 days, Disp-14 tablet, R-0Normal             Diagnosis:  1. Cervical lymphadenitis        Disposition:  Patient's disposition: Discharge to home  Patient's condition is stable.              Judit Weldon MD  11/27/21 5994

## 2021-11-27 NOTE — PROGRESS NOTES
Name: Samara Garcia  : 1980  MRN: [id number]    Date: 2021    Benefits of immediately proceeding with Radiology exam outweigh the risks and therefore the following is being waived:      [x] Pregnancy test    [] Protocol for Iodine allergy    [] MRI questionnaire    [] Marilee Arnold MD

## 2021-12-28 ENCOUNTER — HOSPITAL ENCOUNTER (EMERGENCY)
Age: 41
Discharge: HOME | End: 2021-12-28
Attending: EMERGENCY MEDICINE
Payer: COMMERCIAL

## 2021-12-28 VITALS
HEIGHT: 59 IN | WEIGHT: 160 LBS | HEART RATE: 70 BPM | OXYGEN SATURATION: 98 % | RESPIRATION RATE: 17 BRPM | SYSTOLIC BLOOD PRESSURE: 104 MMHG | TEMPERATURE: 98.7 F | DIASTOLIC BLOOD PRESSURE: 70 MMHG | BODY MASS INDEX: 32.25 KG/M2

## 2021-12-28 DIAGNOSIS — B37.31 VAGINAL YEAST INFECTION: Primary | ICD-10-CM

## 2021-12-28 DIAGNOSIS — Z20.822 ENCOUNTER FOR LABORATORY TESTING FOR COVID-19 VIRUS: ICD-10-CM

## 2021-12-28 PROCEDURE — 6370000000 HC RX 637 (ALT 250 FOR IP): Performed by: EMERGENCY MEDICINE

## 2021-12-28 PROCEDURE — U0003 INFECTIOUS AGENT DETECTION BY NUCLEIC ACID (DNA OR RNA); SEVERE ACUTE RESPIRATORY SYNDROME CORONAVIRUS 2 (SARS-COV-2) (CORONAVIRUS DISEASE [COVID-19]), AMPLIFIED PROBE TECHNIQUE, MAKING USE OF HIGH THROUGHPUT TECHNOLOGIES AS DESCRIBED BY CMS-2020-01-R: HCPCS

## 2021-12-28 PROCEDURE — U0005 INFEC AGEN DETEC AMPLI PROBE: HCPCS

## 2021-12-28 PROCEDURE — 99283 EMERGENCY DEPT VISIT LOW MDM: CPT

## 2021-12-28 RX ORDER — FLUCONAZOLE 150 MG/1
150 TABLET ORAL ONCE
Qty: 1 TABLET | Refills: 1 | Status: SHIPPED | OUTPATIENT
Start: 2021-12-28 | End: 2021-12-28

## 2021-12-28 RX ORDER — FLUCONAZOLE 150 MG/1
150 TABLET ORAL ONCE
Status: COMPLETED | OUTPATIENT
Start: 2021-12-28 | End: 2021-12-28

## 2021-12-28 RX ADMIN — FLUCONAZOLE 150 MG: 150 TABLET ORAL at 09:00

## 2021-12-28 NOTE — ED PROVIDER NOTES
HPI:  21, Time: 8:35 AM JULISSA Weston is a 39 y.o. female presenting to the ED for vaginal yeast infection with itching, dryness and white discharge (no odor), beginning 4 days ago after taking antibiotics. States she always gets this after taking antibiotics. She states she also needs a covid test to go to work because everyone in her family has it at home. She is asymptomatic at this time. Declines pelvic exam, just wants diflucan. The complaint has been persistent, moderate in severity, and worsened by nothing. Denies pregnancy. Patient denies fever/chills, sore throat, cough, congestion, chest pain, shortness of breath, edema, headache, visual disturbances, focal paresthesias, focal weakness, abdominal pain, nausea, vomiting, diarrhea, constipation, dysuria, hematuria, trauma, neck or back pain, rash or other complaints. ROS:   A complete review of systems was performed and all pertinent positives and negatives are stated within HPI, all other systems reviewed and are negative.      --------------------------------------------- PAST HISTORY ---------------------------------------------  Past Medical History:  has a past medical history of Bronchitis and Dental caries. Past Surgical History:  has a past surgical history that includes Induced  (2012);  section; and Tubal ligation. Social History:  reports that she has quit smoking. She has never used smokeless tobacco. She reports current drug use. Drug: Marijuana [employer]). She reports that she does not drink alcohol. Family History: family history is not on file. The patients home medications have been reviewed.     Allergies: Latex        ----------------------------------------PHYSICAL EXAM--------------------------------------  Constitutional:  Well developed, well nourished, no acute distress, non-toxic appearance   Eyes:  PERRL, conjunctiva normal, EOMI  HENT:  Atraumatic, external ears normal, nose normal, oropharynx moist. Neck- normal range of motion, no nuchal rigidity   Respiratory:  No respiratory distress, normal breath sounds, no rales, no wheezing   Cardiovascular:  Normal rate, normal rhythm, no murmurs, no gallops, no rubs. Radial pulses 2+ bilaterally. GI:  Soft, nondistended, normal bowel sounds, nontender, no rebound, no guarding   :  No costovertebral angle tenderness   Musculoskeletal:  No edema, no tenderness, no deformities. Back- no tenderness  Integument:  Well hydrated. Adequate perfusion. Neurologic:  Alert & oriented x 3, CN 2-12 normal, no focal deficits noted. Normal gait. Psychiatric:  Speech and behavior appropriate       -------------------------------------------------- RESULTS -------------------------------------------------  I have personally reviewed all laboratory and imaging results for this patient. Results are listed below. LABS:  No results found for this visit on 12/28/21. RADIOLOGY:  Interpreted by Radiologist.  No orders to display     ------------------------- NURSING NOTES AND VITALS REVIEWED ---------------------------  The nursing notes within the ED encounter and vital signs as below have been reviewed by myself. /70   Pulse 70   Temp 98.7 °F (37.1 °C)   Resp 17   Ht 4' 11\" (1.499 m)   Wt 160 lb (72.6 kg)   LMP 12/26/2021   SpO2 98%   BMI 32.32 kg/m²   Oxygen Saturation Interpretation: Normal      The patients available past medical records and past encounters were reviewed. ------------------------------ ED COURSE/MEDICAL DECISION MAKING----------------------  Medications   fluconazole (DIFLUCAN) tablet 150 mg (has no administration in time range)           Procedures:   none      Medical Decision Making:    Diflucan here and Rx for diflucan for later prn   covid swab done and will result in 3 to 5 days. She is to self isolate/quarantine until her results and infection control measures discussed.   She understands and agrees to plan. She appears well. This patient's ED course included: a personal history and physicial eaxmination    This patient has remained hemodynamically stable during their ED course. Consultations:   none    Critical Care: none    I, Neeraj Anne DO, am the Primary Provider of Record    Counseling: The emergency provider has spoken with the patient and discussed todays results, in addition to providing specific details for the plan of care and counseling regarding the diagnosis and prognosis. Questions are answered at this time and they are agreeable with the plan.    --------------------------- IMPRESSION AND DISPOSITION ---------------------------------    IMPRESSION  1. Vaginal yeast infection    2.  Encounter for laboratory testing for COVID-19 virus        DISPOSITION  Disposition: Discharge to home  Patient condition is stable              Maritza Aguillon DO  12/28/21 8904

## 2021-12-29 LAB — SARS-COV-2, PCR: NOT DETECTED

## 2022-08-28 ENCOUNTER — HOSPITAL ENCOUNTER (EMERGENCY)
Age: 42
Discharge: HOME | End: 2022-08-29
Attending: EMERGENCY MEDICINE
Payer: COMMERCIAL

## 2022-08-28 DIAGNOSIS — J02.8 ACUTE PHARYNGITIS DUE TO OTHER SPECIFIED ORGANISMS: ICD-10-CM

## 2022-08-28 DIAGNOSIS — Z20.822 COUGH WITH EXPOSURE TO COVID-19 VIRUS: ICD-10-CM

## 2022-08-28 DIAGNOSIS — R05.8 COUGH WITH EXPOSURE TO COVID-19 VIRUS: ICD-10-CM

## 2022-08-28 DIAGNOSIS — U07.1 COVID-19: Primary | ICD-10-CM

## 2022-08-28 PROCEDURE — 96374 THER/PROPH/DIAG INJ IV PUSH: CPT

## 2022-08-28 PROCEDURE — 87880 STREP A ASSAY W/OPTIC: CPT

## 2022-08-28 PROCEDURE — 93005 ELECTROCARDIOGRAM TRACING: CPT | Performed by: STUDENT IN AN ORGANIZED HEALTH CARE EDUCATION/TRAINING PROGRAM

## 2022-08-28 PROCEDURE — 99285 EMERGENCY DEPT VISIT HI MDM: CPT

## 2022-08-28 RX ORDER — IPRATROPIUM BROMIDE AND ALBUTEROL SULFATE 2.5; .5 MG/3ML; MG/3ML
1 SOLUTION RESPIRATORY (INHALATION)
Status: DISCONTINUED | OUTPATIENT
Start: 2022-08-29 | End: 2022-08-29 | Stop reason: HOSPADM

## 2022-08-28 RX ORDER — IBUPROFEN 600 MG/1
600 TABLET ORAL ONCE
Status: DISCONTINUED | OUTPATIENT
Start: 2022-08-28 | End: 2022-08-29 | Stop reason: HOSPADM

## 2022-08-28 ASSESSMENT — PAIN DESCRIPTION - LOCATION: LOCATION: CHEST

## 2022-08-28 ASSESSMENT — PAIN SCALES - GENERAL: PAINLEVEL_OUTOF10: 5

## 2022-08-28 ASSESSMENT — PAIN DESCRIPTION - ORIENTATION: ORIENTATION: MID

## 2022-08-28 ASSESSMENT — PAIN DESCRIPTION - DESCRIPTORS: DESCRIPTORS: ACHING;DULL;DISCOMFORT

## 2022-08-28 NOTE — Clinical Note
Adrianne Graf was seen and treated in our emergency department on 8/28/2022. She may return to work on 09/05/2022. If you have any questions or concerns, please don't hesitate to call.       Delmis Lopez MD

## 2022-08-28 NOTE — Clinical Note
Jenn Ferrell was seen and treated in our emergency department on 8/28/2022. She may return to work on 09/05/2022. If you have any questions or concerns, please don't hesitate to call.       Casa Sadler MD

## 2022-08-28 NOTE — Clinical Note
Ayanna Alvarez was seen and treated in our emergency department on 8/28/2022. She may return to work on 08/31/2022. If you have any questions or concerns, please don't hesitate to call.       Jose Guadalupe Wills MD

## 2022-08-29 ENCOUNTER — APPOINTMENT (OUTPATIENT)
Dept: CT IMAGING | Age: 42
End: 2022-08-29
Payer: COMMERCIAL

## 2022-08-29 ENCOUNTER — APPOINTMENT (OUTPATIENT)
Dept: GENERAL RADIOLOGY | Age: 42
End: 2022-08-29
Payer: COMMERCIAL

## 2022-08-29 VITALS
SYSTOLIC BLOOD PRESSURE: 109 MMHG | OXYGEN SATURATION: 98 % | TEMPERATURE: 98.3 F | RESPIRATION RATE: 16 BRPM | HEART RATE: 77 BPM | DIASTOLIC BLOOD PRESSURE: 61 MMHG

## 2022-08-29 LAB
ANION GAP SERPL CALCULATED.3IONS-SCNC: 12 MMOL/L (ref 7–16)
BACTERIA: ABNORMAL /HPF
BASOPHILS ABSOLUTE: 0.01 E9/L (ref 0–0.2)
BASOPHILS RELATIVE PERCENT: 0.2 % (ref 0–2)
BILIRUBIN URINE: NEGATIVE
BLOOD, URINE: NEGATIVE
BUN BLDV-MCNC: 13 MG/DL (ref 6–20)
CALCIUM SERPL-MCNC: 9.5 MG/DL (ref 8.6–10.2)
CHLORIDE BLD-SCNC: 107 MMOL/L (ref 98–107)
CLARITY: CLEAR
CO2: 20 MMOL/L (ref 22–29)
COLOR: YELLOW
CREAT SERPL-MCNC: 1.1 MG/DL (ref 0.5–1)
D DIMER: >5250 NG/ML DDU
EKG ATRIAL RATE: 93 BPM
EKG P AXIS: 51 DEGREES
EKG P-R INTERVAL: 154 MS
EKG Q-T INTERVAL: 346 MS
EKG QRS DURATION: 74 MS
EKG QTC CALCULATION (BAZETT): 430 MS
EKG R AXIS: 25 DEGREES
EKG T AXIS: 29 DEGREES
EKG VENTRICULAR RATE: 93 BPM
EOSINOPHILS ABSOLUTE: 0.03 E9/L (ref 0.05–0.5)
EOSINOPHILS RELATIVE PERCENT: 0.5 % (ref 0–6)
EPITHELIAL CELLS, UA: ABNORMAL /HPF
GFR AFRICAN AMERICAN: >60
GFR NON-AFRICAN AMERICAN: >60 ML/MIN/1.73
GLUCOSE BLD-MCNC: 102 MG/DL (ref 74–99)
GLUCOSE URINE: NEGATIVE MG/DL
HCG(URINE) PREGNANCY TEST: NEGATIVE
HCT VFR BLD CALC: 36 % (ref 34–48)
HEMOGLOBIN: 12.5 G/DL (ref 11.5–15.5)
IMMATURE GRANULOCYTES #: 0.01 E9/L
IMMATURE GRANULOCYTES %: 0.2 % (ref 0–5)
INFLUENZA A: NOT DETECTED
INFLUENZA B: NOT DETECTED
KETONES, URINE: NEGATIVE MG/DL
LEUKOCYTE ESTERASE, URINE: NEGATIVE
LYMPHOCYTES ABSOLUTE: 0.79 E9/L (ref 1.5–4)
LYMPHOCYTES RELATIVE PERCENT: 13.3 % (ref 20–42)
MCH RBC QN AUTO: 30.8 PG (ref 26–35)
MCHC RBC AUTO-ENTMCNC: 34.7 % (ref 32–34.5)
MCV RBC AUTO: 88.7 FL (ref 80–99.9)
MONOCYTES ABSOLUTE: 0.6 E9/L (ref 0.1–0.95)
MONOCYTES RELATIVE PERCENT: 10.1 % (ref 2–12)
NEUTROPHILS ABSOLUTE: 4.48 E9/L (ref 1.8–7.3)
NEUTROPHILS RELATIVE PERCENT: 75.7 % (ref 43–80)
NITRITE, URINE: NEGATIVE
PDW BLD-RTO: 13.3 FL (ref 11.5–15)
PH UA: 7 (ref 5–9)
PLATELET # BLD: 239 E9/L (ref 130–450)
PMV BLD AUTO: 10.1 FL (ref 7–12)
POTASSIUM REFLEX MAGNESIUM: 4.2 MMOL/L (ref 3.5–5)
PROTEIN UA: NEGATIVE MG/DL
RBC # BLD: 4.06 E12/L (ref 3.5–5.5)
RBC UA: ABNORMAL /HPF (ref 0–2)
SARS-COV-2 RNA, RT PCR: DETECTED
SODIUM BLD-SCNC: 139 MMOL/L (ref 132–146)
SPECIFIC GRAVITY UA: 1.01 (ref 1–1.03)
STREP GRP A PCR: NEGATIVE
TROPONIN, HIGH SENSITIVITY: <6 NG/L (ref 0–9)
UROBILINOGEN, URINE: 0.2 E.U./DL
WBC # BLD: 5.9 E9/L (ref 4.5–11.5)
WBC UA: ABNORMAL /HPF (ref 0–5)

## 2022-08-29 PROCEDURE — 6360000004 HC RX CONTRAST MEDICATION: Performed by: RADIOLOGY

## 2022-08-29 PROCEDURE — 6360000002 HC RX W HCPCS: Performed by: STUDENT IN AN ORGANIZED HEALTH CARE EDUCATION/TRAINING PROGRAM

## 2022-08-29 PROCEDURE — 84484 ASSAY OF TROPONIN QUANT: CPT

## 2022-08-29 PROCEDURE — 6370000000 HC RX 637 (ALT 250 FOR IP): Performed by: EMERGENCY MEDICINE

## 2022-08-29 PROCEDURE — 2580000003 HC RX 258: Performed by: RADIOLOGY

## 2022-08-29 PROCEDURE — 96374 THER/PROPH/DIAG INJ IV PUSH: CPT

## 2022-08-29 PROCEDURE — 85025 COMPLETE CBC W/AUTO DIFF WBC: CPT

## 2022-08-29 PROCEDURE — 87636 SARSCOV2 & INF A&B AMP PRB: CPT

## 2022-08-29 PROCEDURE — 71045 X-RAY EXAM CHEST 1 VIEW: CPT

## 2022-08-29 PROCEDURE — 6370000000 HC RX 637 (ALT 250 FOR IP)

## 2022-08-29 PROCEDURE — 85378 FIBRIN DEGRADE SEMIQUANT: CPT

## 2022-08-29 PROCEDURE — 81001 URINALYSIS AUTO W/SCOPE: CPT

## 2022-08-29 PROCEDURE — 80048 BASIC METABOLIC PNL TOTAL CA: CPT

## 2022-08-29 PROCEDURE — 71275 CT ANGIOGRAPHY CHEST: CPT

## 2022-08-29 PROCEDURE — 81025 URINE PREGNANCY TEST: CPT

## 2022-08-29 PROCEDURE — 36415 COLL VENOUS BLD VENIPUNCTURE: CPT

## 2022-08-29 RX ORDER — ALBUTEROL SULFATE 2.5 MG/3ML
2.5 SOLUTION RESPIRATORY (INHALATION) EVERY 6 HOURS PRN
Qty: 90 EACH | Refills: 0 | Status: SHIPPED | OUTPATIENT
Start: 2022-08-29

## 2022-08-29 RX ORDER — BENZONATATE 100 MG/1
200 CAPSULE ORAL 3 TIMES DAILY PRN
Qty: 30 CAPSULE | Refills: 1 | Status: SHIPPED | OUTPATIENT
Start: 2022-08-29 | End: 2022-09-05

## 2022-08-29 RX ORDER — ALBUTEROL SULFATE 90 UG/1
2 AEROSOL, METERED RESPIRATORY (INHALATION) EVERY 6 HOURS PRN
Qty: 18 G | Refills: 0 | Status: SHIPPED | OUTPATIENT
Start: 2022-08-29 | End: 2022-09-05

## 2022-08-29 RX ORDER — LORATADINE AND PSEUDOEPHEDRINE SULFATE 5; 120 MG/1; MG/1
1 TABLET, EXTENDED RELEASE ORAL 2 TIMES DAILY
Qty: 20 TABLET | Refills: 0 | Status: SHIPPED | OUTPATIENT
Start: 2022-08-29

## 2022-08-29 RX ORDER — SODIUM CHLORIDE 0.9 % (FLUSH) 0.9 %
10 SYRINGE (ML) INJECTION ONCE
Status: COMPLETED | OUTPATIENT
Start: 2022-08-29 | End: 2022-08-29

## 2022-08-29 RX ORDER — GUAIFENESIN/DEXTROMETHORPHAN 100-10MG/5
10 SYRUP ORAL ONCE
Status: COMPLETED | OUTPATIENT
Start: 2022-08-29 | End: 2022-08-29

## 2022-08-29 RX ORDER — IBUPROFEN 600 MG/1
600 TABLET ORAL EVERY 6 HOURS PRN
Qty: 20 TABLET | Refills: 0 | Status: SHIPPED | OUTPATIENT
Start: 2022-08-29 | End: 2022-09-03

## 2022-08-29 RX ORDER — DEXAMETHASONE SODIUM PHOSPHATE 4 MG/ML
4 INJECTION, SOLUTION INTRA-ARTICULAR; INTRALESIONAL; INTRAMUSCULAR; INTRAVENOUS; SOFT TISSUE ONCE
Status: COMPLETED | OUTPATIENT
Start: 2022-08-29 | End: 2022-08-29

## 2022-08-29 RX ORDER — IPRATROPIUM BROMIDE AND ALBUTEROL SULFATE 2.5; .5 MG/3ML; MG/3ML
SOLUTION RESPIRATORY (INHALATION)
Status: COMPLETED
Start: 2022-08-29 | End: 2022-08-29

## 2022-08-29 RX ADMIN — GUAIFENESIN SYRUP AND DEXTROMETHORPHAN 10 ML: 100; 10 SYRUP ORAL at 00:35

## 2022-08-29 RX ADMIN — IPRATROPIUM BROMIDE AND ALBUTEROL SULFATE 3 ML: .5; 2.5 SOLUTION RESPIRATORY (INHALATION) at 04:34

## 2022-08-29 RX ADMIN — IOPAMIDOL 75 ML: 755 INJECTION, SOLUTION INTRAVENOUS at 02:35

## 2022-08-29 RX ADMIN — SODIUM CHLORIDE, PRESERVATIVE FREE 10 ML: 5 INJECTION INTRAVENOUS at 02:35

## 2022-08-29 RX ADMIN — DEXAMETHASONE SODIUM PHOSPHATE 4 MG: 4 INJECTION, SOLUTION INTRAMUSCULAR; INTRAVENOUS at 00:37

## 2022-08-29 ASSESSMENT — ENCOUNTER SYMPTOMS
RHINORRHEA: 0
DIARRHEA: 0
NAUSEA: 0
VOMITING: 0
SHORTNESS OF BREATH: 1
ABDOMINAL PAIN: 0
SORE THROAT: 1
WHEEZING: 0
COUGH: 1
BACK PAIN: 0
EYE PAIN: 0

## 2022-08-29 ASSESSMENT — PAIN - FUNCTIONAL ASSESSMENT: PAIN_FUNCTIONAL_ASSESSMENT: NONE - DENIES PAIN

## 2022-08-29 NOTE — DISCHARGE INSTRUCTIONS
NOTE:  Get IMMEDIATE medical attention if any NEW/WORSENING symptoms occur OR  if your pulse oximeter readings are < 90%

## 2022-08-29 NOTE — ED PROVIDER NOTES
ATTENDING ATTESTATION:  ATTENDING PROVIDER ATTESTATION:     Ailin He presented to the emergency department for evaluation of Cough, Shortness of Breath, and Chest Pain   and was initially evaluated by the Medical Resident. See Original ED Note for H&P and ED course above. I have reviewed and discussed the case, including pertinent history (medical, surgical, family and social) and exam findings with the Medical Resident assigned to Larissasarah He. I have personally performed and/or participated in the history, exam, medical decision making, and procedures and agree with all pertinent clinical information. I have reviewed my findings and recommendations with the assigned Medical Resident, Ailin He and members of family present at the time of disposition. My findings/plan: The primary encounter diagnosis was COVID-19. Diagnoses of Acute pharyngitis due to other specified organisms and Cough with exposure to COVID-19 virus were also pertinent to this visit. New Prescriptions    ALBUTEROL (PROVENTIL) (2.5 MG/3ML) 0.083% NEBULIZER SOLUTION    Take 3 mLs by nebulization every 6 hours as needed for Wheezing or Shortness of Breath    ALBUTEROL SULFATE HFA (PROAIR HFA) 108 (90 BASE) MCG/ACT INHALER    Inhale 2 puffs into the lungs every 6 hours as needed for Shortness of Breath    BENZONATATE (TESSALON PERLES) 100 MG CAPSULE    Take 2 capsules by mouth 3 times daily as needed for Cough    CLARITIN-D 12 HOUR 5-120 MG PER EXTENDED RELEASE TABLET    Take 1 tablet by mouth 2 times daily For congestion relief as needed.     IBUPROFEN (ADVIL;MOTRIN) 600 MG TABLET    Take 1 tablet by mouth every 6 hours as needed for Pain     MD Maria Luisa Ness MD  08/29/22 [id number]

## 2022-08-29 NOTE — ED PROVIDER NOTES
[address] ENCOUNTER      Pt Name: Luana Beach  MRN: [id number]  [place] 1980  Date of evaluation: 8/28/2022      CHIEF COMPLAINT       Chief Complaint   Patient presents with    Cough    Shortness of Breath    Chest Pain        HPI  Luana Beach is a 39 y.o. female presenting to the emergency department with complaint of sore throat, dry cough, chest pain, shortness of breath, and left lower extremity pain that started this morning when she woke up. She took a prednisone and Motrin which mildly improved her symptoms. Her chest pain is located in the center of her chest, rated as a 5/10 in severity, and is dull and aching. She states she feels like every time she swallows her throat was going to close up. She denies any positive sick contacts. Patient is not vaccinated for COVID-19. She admits to smoking marijuana. She denies any fevers, chills, nausea, vomiting, headache, abdominal pain, diarrhea, constipation, or dysuria. On chart review, patient is noted to have HIV in her past medical history, however patient stated this was incorrect. Except as noted above the remainder of the review of systems was reviewed and negative. Review of Systems   Constitutional:  Positive for fatigue. Negative for chills and fever. HENT:  Positive for sore throat. Negative for congestion, ear pain and rhinorrhea. Eyes:  Negative for pain. Respiratory:  Positive for cough and shortness of breath. Negative for wheezing. Cardiovascular:  Positive for chest pain and leg swelling. Negative for palpitations. Gastrointestinal:  Negative for abdominal pain, diarrhea, nausea and vomiting. Genitourinary:  Negative for flank pain. Musculoskeletal:  Negative for back pain and neck pain. Skin:  Negative for rash. Neurological:  Negative for headaches. Psychiatric/Behavioral:  Negative for behavioral problems.  The patient is not nervous/anxious. Physical Exam  Constitutional:       General: She is not in acute distress. Appearance: Normal appearance. She is not ill-appearing. HENT:      Head: Normocephalic and atraumatic. Right Ear: External ear normal.      Left Ear: External ear normal.      Nose: Nose normal.      Mouth/Throat:      Mouth: Mucous membranes are moist.      Pharynx: No oropharyngeal exudate. Eyes:      Extraocular Movements: Extraocular movements intact. Conjunctiva/sclera: Conjunctivae normal.      Pupils: Pupils are equal, round, and reactive to light. Cardiovascular:      Rate and Rhythm: Normal rate. Pulses: Normal pulses. Pulmonary:      Effort: No respiratory distress. Breath sounds: Normal breath sounds. Abdominal:      Palpations: Abdomen is soft. Tenderness: There is no abdominal tenderness. There is no guarding or rebound. Musculoskeletal:         General: No deformity or signs of injury. Cervical back: Neck supple. No rigidity. Skin:     General: Skin is warm and dry. Neurological:      General: No focal deficit present. Mental Status: She is alert and oriented to person, place, and time. Mental status is at baseline. Psychiatric:         Mood and Affect: Mood normal.        Procedures     MDM     Amount and/or Complexity of Data Reviewed  Clinical lab tests: reviewed  Tests in the medicine section of CPT®: reviewed    Patient is a 80-year-old female with cough, pharyngitis, shortness of breath, chest pain. She appeared in no acute distress with stable vital signs. EKG showed no ischemic changes. CTA pulmonary showed no PE. Patient's symptoms are consistent with COVID-19 infection. Patient has stable vital signs and good oxygen saturations at rest. No evidence of respiratory distress. Baseline mentation. No acute emergent findings in the ED. Patient is appropriate for outpatient management and PCP follow up.  Supportive care instructions and strict ED return precautions discussed. She was supplied a pulse oximeter. Patient is awake alert, hemodynamic stable, afebrile and in no respiratory distress. Discussed with patient plan for close outpatient follow-up with the patient's PCP as well as return precautions and the patient understands and agrees to the plan. ED Course as of 08/29/22 1940   Mon Aug 29, 2022   0001 EKG read and interpreted by me. Rate 93, normal sinus rhythm, normal axis, no ST elevation, normal intervals, stable compared to previous EKG. [TO]      ED Course User Index  [TO] Patricio Campbell DO           ED Course as of 08/29/22 1940   Mon Aug 29, 2022   0001 EKG read and interpreted by me. Rate 93, normal sinus rhythm, normal axis, no ST elevation, normal intervals, stable compared to previous EKG. [TO]      ED Course User Index  [TO] Patricio Campbell        --------------------------------------------- PAST HISTORY ---------------------------------------------  Past Medical History:  has a past medical history of Bronchitis and Dental caries. Past Surgical History:  has a past surgical history that includes Induced  (2012);  section; and Tubal ligation. Social History:  reports that she has quit smoking. She has never used smokeless tobacco. She reports current drug use. Drug: Marijuana Thomas Carter). She reports that she does not drink alcohol. Family History: family history is not on file. The patients home medications have been reviewed.     Allergies: Latex    -------------------------------------------------- RESULTS -------------------------------------------------  Labs:  Results for orders placed or performed during the hospital encounter of 22   COVID-19 & Influenza Combo    Specimen: Nasopharyngeal Swab   Result Value Ref Range    SARS-CoV-2 RNA, RT PCR DETECTED (A) NOT DETECTED    INFLUENZA A NOT DETECTED NOT DETECTED    INFLUENZA B NOT DETECTED NOT DETECTED   Strep Screen Group A Throat    Specimen: Throat   Result Value Ref Range    Strep Grp A PCR Negative Negative   CBC with Auto Differential   Result Value Ref Range    WBC 5.9 4.5 - 11.5 E9/L    RBC 4.06 3.50 - 5.50 E12/L    Hemoglobin 12.5 11.5 - 15.5 g/dL    Hematocrit 36.0 34.0 - 48.0 %    MCV 88.7 80.0 - 99.9 fL    MCH 30.8 26.0 - 35.0 pg    MCHC 34.7 (H) 32.0 - 34.5 %    RDW 13.3 11.5 - 15.0 fL    Platelets 039 135 - 866 E9/L    MPV 10.1 7.0 - 12.0 fL    Neutrophils % 75.7 43.0 - 80.0 %    Immature Granulocytes % 0.2 0.0 - 5.0 %    Lymphocytes % 13.3 (L) 20.0 - 42.0 %    Monocytes % 10.1 2.0 - 12.0 %    Eosinophils % 0.5 0.0 - 6.0 %    Basophils % 0.2 0.0 - 2.0 %    Neutrophils Absolute 4.48 1.80 - 7.30 E9/L    Immature Granulocytes # 0.01 E9/L    Lymphocytes Absolute 0.79 (L) 1.50 - 4.00 E9/L    Monocytes Absolute 0.60 0.10 - 0.95 E9/L    Eosinophils Absolute 0.03 (L) 0.05 - 0.50 E9/L    Basophils Absolute 0.01 0.00 - 0.20 E9/L   Troponin   Result Value Ref Range    Troponin, High Sensitivity <6 0 - 9 ng/L   D-Dimer, Quantitative   Result Value Ref Range    D-Dimer, Quant >5250 ng/mL DDU   Urinalysis with Microscopic   Result Value Ref Range    Color, UA Yellow Straw/Yellow    Clarity, UA Clear Clear    Glucose, Ur Negative Negative mg/dL    Bilirubin Urine Negative Negative    Ketones, Urine Negative Negative mg/dL    Specific Gravity, UA 1.010 1.005 - 1.030    Blood, Urine Negative Negative    pH, UA 7.0 5.0 - 9.0    Protein, UA Negative Negative mg/dL    Urobilinogen, Urine 0.2 <2.0 E.U./dL    Nitrite, Urine Negative Negative    Leukocyte Esterase, Urine Negative Negative    WBC, UA 0-1 0 - 5 /HPF    RBC, UA NONE 0 - 2 /HPF    Epithelial Cells, UA RARE /HPF    Bacteria, UA RARE (A) None Seen /HPF   Pregnancy, Urine   Result Value Ref Range    HCG(Urine) Pregnancy Test NEGATIVE NEGATIVE   Basic Metabolic Panel w/ Reflex to MG   Result Value Ref Range    Sodium 139 132 - 146 mmol/L    Potassium reflex Magnesium 4.2 3.5 - 5.0 mmol/L    Chloride 107 98 - 107 mmol/L    CO2 20 (L) 22 - 29 mmol/L    Anion Gap 12 7 - 16 mmol/L    Glucose 102 (H) 74 - 99 mg/dL    BUN 13 6 - 20 mg/dL    Creatinine 1.1 (H) 0.5 - 1.0 mg/dL    GFR Non-African American >60 >=60 mL/min/1.73    GFR African American >60     Calcium 9.5 8.6 - 10.2 mg/dL   EKG 12 Lead   Result Value Ref Range    Ventricular Rate 93 BPM    Atrial Rate 93 BPM    P-R Interval 154 ms    QRS Duration 74 ms    Q-T Interval 346 ms    QTc Calculation (Bazett) 430 ms    P Axis 51 degrees    R Axis 25 degrees    T Axis 29 degrees       Radiology:  CTA PULMONARY W CONTRAST   Final Result   No evidence of pulmonary embolism or acute pulmonary abnormality. XR CHEST PORTABLE   Final Result   No acute cardiopulmonary process. ------------------------- NURSING NOTES AND VITALS REVIEWED ---------------------------  Date / Time Roomed:  8/28/2022 11:09 PM  ED Bed Assignment:  05/05    The nursing notes within the ED encounter and vital signs as below have been reviewed. /61   Pulse 77   Temp 98.3 °F (36.8 °C) (Oral)   Resp 16   SpO2 98%   Oxygen Saturation Interpretation: normal      ------------------------------------------ PROGRESS NOTES ------------------------------------------    I have spoken with the patient and discussed todays results, in addition to providing specific details for the plan of care and counseling regarding the diagnosis and prognosis. Their questions are answered at this time and they are agreeable with the plan. I discussed at length with them reasons for immediate return here for re evaluation. They will followup with their PCP by calling their office tomorrow. --------------------------------- ADDITIONAL PROVIDER NOTES ---------------------------------  At this time the patient is without objective evidence of an acute process requiring hospitalization or inpatient management.   They have remained hemodynamically stable throughout their entire ED visit and are stable for discharge with outpatient follow-up. The plan has been discussed in detail and they are aware of the specific conditions for emergent return, as well as the importance of follow-up. Discharge Medication List as of 8/29/2022  4:39 AM        START taking these medications    Details   CLARITIN-D 12 HOUR 5-120 MG per extended release tablet Take 1 tablet by mouth 2 times daily For congestion relief as needed. , Disp-20 tablet, R-0, DAWNormal      albuterol (PROVENTIL) (2.5 MG/3ML) 0.083% nebulizer solution Take 3 mLs by nebulization every 6 hours as needed for Wheezing or Shortness of Breath, Disp-90 each, R-0Normal      ibuprofen (ADVIL;MOTRIN) 600 MG tablet Take 1 tablet by mouth every 6 hours as needed for Pain, Disp-20 tablet, R-0Normal      benzonatate (TESSALON PERLES) 100 MG capsule Take 2 capsules by mouth 3 times daily as needed for Cough, Disp-30 capsule, R-1Normal      albuterol sulfate HFA (PROAIR HFA) 108 (90 Base) MCG/ACT inhaler Inhale 2 puffs into the lungs every 6 hours as needed for Shortness of Breath, Disp-18 g, R-0Normal             Diagnosis:  1. COVID-19    2. Acute pharyngitis due to other specified organisms    3. Cough with exposure to COVID-19 virus        Disposition:  Patient's disposition: Discharge to home  Patient's condition is stable.         Dre Waller,   Resident  08/29/22 [id number]

## 2022-09-19 ENCOUNTER — HOSPITAL ENCOUNTER (OUTPATIENT)
Dept: NEUROLOGY | Age: 42
Discharge: HOME | End: 2022-09-19
Payer: COMMERCIAL

## 2022-09-19 VITALS — BODY MASS INDEX: 32.25 KG/M2 | HEIGHT: 59 IN | WEIGHT: 160 LBS

## 2022-09-19 PROCEDURE — 95886 MUSC TEST DONE W/N TEST COMP: CPT

## 2022-09-19 PROCEDURE — 95909 NRV CNDJ TST 5-6 STUDIES: CPT

## 2022-09-19 NOTE — PROCEDURES
[facility]  [address] [facility], [address] [place] [address]  Phone: [phone]  Fax: [phone]      Referring Provider: Paulina Robles MD  Primary Care Physician: Anna Arellano MD  Patient Name: Km Sanchez  Patient YOB: 1980  Gender: female  BMI: Body mass index is 32.32 kg/m². Height 4' 11\" (1.499 m), weight 160 lb (72.6 kg), not currently breastfeeding. 9/19/2022    Reason for referral: EMG    Description of clinical problem:   No chief complaint on file. Pain Yes   ; Numbness/tingling  Yes; Weakness  No       Brief physical exam:   Sensory deficit No; Weakness No; Atrophy  No; Reflex abnormality No      Study Limitations:  none    Summary of Findings:   Nerve conduction studies: The following nerve conduction studies were abnormal:   none  All other nerve conduction studies listed in the table above were normal in latency, amplitude and conduction velocity. [address]   Electrodiagnostic Laboratory  [place]        Full Name: Eneida Ruiz Gender: Female  MRN: [id number] YOB: 1980  Location[de-identified] Outpt. Visit Date: 9/19/2022 08:07  Age: 39 Years 5 Months Old  Examining Physician: Dr. Trevon López  Referring Physician: Dr. Torie Kim  Technician: Mary Beth Starkey   Height: 4 feet 11 inch  Weight: 160 lbs  Notes: Pain left leg M79.605      Motor NCS      Nerve / Sites Lat. Amplitude Distance Lat Diff Velocity Temp. Amp. 1-2    ms mV cm ms m/s °C %   L Peroneal - EDB      Ankle 3.70 7.1 8   31.2 100      Pop fossa 10.21 6.6 36 6.51 55 31.2 93   L Tibial - AH      Ankle 3.59 9.0 8   31.3 100      Pop fossa 10.68 8.1 37 7.08 52 31.3 89.8       Sensory NCS      Nerve / Sites Onset Lat Peak Lat PP Amp Distance Velocity Temp.    ms ms µV cm m/s °C   L Superficial peroneal - Ankle      Lat leg 2.24 3.13 13.3 10 45 31.2   L Sural - Ankle (Calf)      Calf 2.76 3.23 17.6 14 51 31.2       F  Wave

## 2022-12-01 ENCOUNTER — OFFICE VISIT (OUTPATIENT)
Dept: OBGYN | Age: 42
End: 2022-12-01
Payer: COMMERCIAL

## 2022-12-01 VITALS
SYSTOLIC BLOOD PRESSURE: 123 MMHG | DIASTOLIC BLOOD PRESSURE: 87 MMHG | HEART RATE: 78 BPM | HEIGHT: 59 IN | BODY MASS INDEX: 33.06 KG/M2 | WEIGHT: 164 LBS

## 2022-12-01 DIAGNOSIS — N93.9 ABNORMAL UTERINE BLEEDING: ICD-10-CM

## 2022-12-01 DIAGNOSIS — R10.2 PELVIC PAIN: Primary | ICD-10-CM

## 2022-12-01 DIAGNOSIS — R30.0 DYSURIA: ICD-10-CM

## 2022-12-01 DIAGNOSIS — R10.2 PELVIC PAIN: ICD-10-CM

## 2022-12-01 LAB
CLUE CELLS: NORMAL
SOURCE WET PREP: NORMAL
TRICHOMONAS PREP: NORMAL
YEAST WET PREP: NORMAL

## 2022-12-01 PROCEDURE — 1036F TOBACCO NON-USER: CPT | Performed by: OBSTETRICS & GYNECOLOGY

## 2022-12-01 PROCEDURE — G8484 FLU IMMUNIZE NO ADMIN: HCPCS | Performed by: OBSTETRICS & GYNECOLOGY

## 2022-12-01 PROCEDURE — 99204 OFFICE O/P NEW MOD 45 MIN: CPT | Performed by: OBSTETRICS & GYNECOLOGY

## 2022-12-01 PROCEDURE — 99203 OFFICE O/P NEW LOW 30 MIN: CPT | Performed by: OBSTETRICS & GYNECOLOGY

## 2022-12-01 PROCEDURE — G8417 CALC BMI ABV UP PARAM F/U: HCPCS | Performed by: OBSTETRICS & GYNECOLOGY

## 2022-12-01 PROCEDURE — G8427 DOCREV CUR MEDS BY ELIG CLIN: HCPCS | Performed by: OBSTETRICS & GYNECOLOGY

## 2022-12-01 RX ORDER — FERROUS SULFATE 325(65) MG
TABLET ORAL
COMMUNITY
Start: 2022-10-21

## 2022-12-01 NOTE — PROGRESS NOTES
[facility]     Patient presents for discussion regarding essure removal.     Patient had the essure placed in . Patient has been having pelvic and vaginal pain for some time. States the pain has started to become constant. Patient had been seeing Dr. Urbano Álvarez for gyn care. She has had complaints of pelvic pain. Pelvic floor physical therapy was recommended due to myofascial pain. Patient did not have that done. Patient had a pelvic ultrasound that showed a 7cm uterus, 1.5cm posterior uterine fibroid, normal ovaries. Patient with some vaginal discharge. Denies odor and itching. Patient has pelvic pain during intercourse as well. States it is mostly pelvic. Patient denies any urinary frequency. She does have pain prior to urination that resolves with urination. Patient states her periods come every 3 weeks. They are heavy and last close to 7 days. Patient states she had a pap smear recently. Past Medical History:   Diagnosis Date    Bronchitis     Dental caries         Past Surgical History:   Procedure Laterality Date     SECTION      INDUCED   2012    TUBAL LIGATION          No family history on file. Current Outpatient Medications:     CLARITIN-D 12 HOUR 5-120 MG per extended release tablet, Take 1 tablet by mouth 2 times daily For congestion relief as needed. , Disp: 20 tablet, Rfl: 0    albuterol (PROVENTIL) (2.5 MG/3ML) 0.083% nebulizer solution, Take 3 mLs by nebulization every 6 hours as needed for Wheezing or Shortness of Breath, Disp: 90 each, Rfl: 0    albuterol sulfate HFA (PROAIR HFA) 108 (90 Base) MCG/ACT inhaler, Inhale 2 puffs into the lungs every 6 hours as needed for Shortness of Breath, Disp: 18 g, Rfl: 0    albuterol-ipratropium (COMBIVENT RESPIMAT)  MCG/ACT AERS inhaler, Inhale 1 puff into the lungs every 4 hours as needed for Wheezing, Disp: 1 Inhaler, Rfl: 0    VITAMIN D PO, Take by mouth, Disp: , Rfl:     Tor Hasten 325 (65 Fe) MG tablet, take 1 tablet by mouth twice a day, Disp: , Rfl:     ibuprofen (ADVIL;MOTRIN) 600 MG tablet, Take 1 tablet by mouth every 6 hours as needed for Pain, Disp: 20 tablet, Rfl: 0     Allergies   Allergen Reactions    Latex         Social History       Tobacco History       Smoking Status  Former      Smokeless Tobacco Use  Never              Alcohol History       Alcohol Use Status  No              Drug Use       Drug Use Status  Yes Types  Marijuana (Weed) Comment  occasionally              Sexual Activity       Sexually Active  Not Asked                     Vitals:    12/01/22 1331   BP: 123/87   Pulse: 78        Physical Exam:  General: pleasant, alert     Breasts: deferred     Pelvic exam: normal external genitalia, vulva, vagina, cervix, uterus and adnexa. Scant discharge. No uterine or adnexal masses. Tenderness to palpation of left uterosacral ligament and over bladder. No uterine tenderness. No vaginal spasms noted. Bereket Medina was seen today for new patient, abdominal pain and other. Diagnoses and all orders for this visit:    Pelvic pain  -     Miscellaneous sendout 2; Future  -     Wet prep, genital  -     C.trachomatis N.gonorrhoeae DNA; Future    Dysuria  -     Culture, Urine; Future  -     [facility] - Rodney Sam MD, Urology, [place]    Abnormal uterine bleeding    Discussed possible causes of pain including concern for bladder origin (infection, interstitial cystitis), essure, endometriosis. Cultures ordered. Will proceed with EMB. Will continue to discuss possible hysterectomy. Return for Endometrial biopsy.      Yesika Christiansen MD

## 2022-12-02 DIAGNOSIS — R10.2 PELVIC PAIN: ICD-10-CM

## 2022-12-02 DIAGNOSIS — R30.0 DYSURIA: ICD-10-CM

## 2022-12-02 LAB
SOURCE: NORMAL
SOURCE: NORMAL

## 2022-12-04 LAB — URINE CULTURE, ROUTINE: NORMAL

## 2022-12-08 LAB
Lab: NORMAL
REPORT: NORMAL
THIS TEST SENT TO: NORMAL

## 2022-12-13 ENCOUNTER — TELEPHONE (OUTPATIENT)
Dept: ADMINISTRATIVE | Age: 42
End: 2022-12-13

## 2022-12-13 NOTE — TELEPHONE ENCOUNTER
Pt called and canceled procedure that was scheduled for today with Dr. Kylie Disla. Staff unavailable due to pt care. Please contact pt at [phone] to reschedule.

## 2022-12-19 NOTE — TELEPHONE ENCOUNTER
Called patient again. Woman answered phone and stated she was not in. Left message for her to call office back.

## 2023-01-31 NOTE — PROGRESS NOTES
New patient alert and pleasant with multiple complaints. Here today for annual GYN exam.  Pelvic exam, wet prep, ureaplasma vaginal obtained, labeled  and delivered to lab. Urine for culture and GCC obtained, labeled and sent to lab  Discharge instructions have been discussed with the patient. Patient advised to call our office with any questions or concerns. Voiced understanding. Secondary Defect Length (In Cm): 0

## 2023-02-23 ENCOUNTER — PROCEDURE VISIT (OUTPATIENT)
Dept: OBGYN | Age: 43
End: 2023-02-23
Payer: COMMERCIAL

## 2023-02-23 VITALS
DIASTOLIC BLOOD PRESSURE: 75 MMHG | SYSTOLIC BLOOD PRESSURE: 116 MMHG | BODY MASS INDEX: 33.53 KG/M2 | HEART RATE: 73 BPM | WEIGHT: 166 LBS

## 2023-02-23 DIAGNOSIS — R10.2 PELVIC PAIN: Primary | ICD-10-CM

## 2023-02-23 DIAGNOSIS — N93.9 ABNORMAL UTERINE BLEEDING: ICD-10-CM

## 2023-02-23 DIAGNOSIS — N94.9 VAGINAL BURNING: ICD-10-CM

## 2023-02-23 PROCEDURE — 99214 OFFICE O/P EST MOD 30 MIN: CPT | Performed by: OBSTETRICS & GYNECOLOGY

## 2023-02-23 PROCEDURE — 58100 BIOPSY OF UTERUS LINING: CPT | Performed by: OBSTETRICS & GYNECOLOGY

## 2023-02-23 RX ORDER — CONJUGATED ESTROGENS 0.62 MG/G
0.5 CREAM VAGINAL DAILY
Qty: 30 G | Refills: 1 | Status: SHIPPED | OUTPATIENT
Start: 2023-02-23

## 2023-02-23 NOTE — PROGRESS NOTES
Endometrial Biopsy     Rose Chun 42 y.o. presents today for endometrial biopsy.  She has had abnormal uterine bleeding.    Patient with multiple complaints at last visit, see note 12/1/22. Referral placed to urology, awaiting appointment. Will resend today.     Patient with significant vaginal burning. States it is around her labia and clitoral agosto. Cultures negative at last visit. Will do trial of  boric acid. Premarin cream to areas as well.     Vitals:  /75   Pulse 73   Wt 166 lb (75.3 kg)   LMP 02/12/2023   BMI 33.53 kg/m²       The risks, benefits and alternatives to the procedure were discussed. Patient agrees to proceed. Verbal and written consent obtained.         Endometrial biospy:      Procedure note: Speculum was placed with visualization of the cervix. Betadine was applied. A single tooth tenaculum was applied on the anterior lip of the cervix. The uterus sounded to 8cm. Tissue specimen was obtained. All instruments were removed and hemostasis was noted.     Reviewed nothing in the vagina (intercourse, tampons, baths, etc.) for 48 hours.     Assessment:        Diagnosis Orders   1. Pelvic pain        2. Abnormal uterine bleeding  Surgical Pathology      3. Vaginal burning  Boric Acid Vaginal 600 MG SUPP    PREMARIN 0.625 MG/GM CREA vaginal cream            Plan:       Tissue sent to pathology.  Patient willfollow up for results in two months.      Deepali Lange MD  2/23/23, 10:53 AM EST

## 2023-02-23 NOTE — PROGRESS NOTES
Here today for Endometrial biopsy Instructed on the procedure of Endometrial biopsy voiced understanding and permit signed for Endometrial biopsy  Prepared for procedure. Time out done by  Prior to starting the procedure. Tolerated procedure. No bleeding noted after procedure. Biopsy obtained, labeled and sent to lab   To return in one week for results. Discharge instructions reviewed verbally and written AVS given to patient. Voiced understanding and agreement.   No baths, tampons, intercourse and nothing in the vagina for 48 hours

## 2023-03-29 ENCOUNTER — APPOINTMENT (OUTPATIENT)
Dept: CT IMAGING | Age: 43
End: 2023-03-29
Payer: COMMERCIAL

## 2023-03-29 ENCOUNTER — HOSPITAL ENCOUNTER (EMERGENCY)
Age: 43
Discharge: HOME | End: 2023-03-29
Attending: STUDENT IN AN ORGANIZED HEALTH CARE EDUCATION/TRAINING PROGRAM
Payer: COMMERCIAL

## 2023-03-29 VITALS
HEART RATE: 70 BPM | RESPIRATION RATE: 16 BRPM | DIASTOLIC BLOOD PRESSURE: 80 MMHG | HEIGHT: 59 IN | SYSTOLIC BLOOD PRESSURE: 120 MMHG | WEIGHT: 165 LBS | TEMPERATURE: 97.9 F | BODY MASS INDEX: 33.26 KG/M2 | OXYGEN SATURATION: 99 %

## 2023-03-29 DIAGNOSIS — J02.0 STREPTOCOCCAL SORE THROAT: Primary | ICD-10-CM

## 2023-03-29 LAB
ANION GAP SERPL CALCULATED.3IONS-SCNC: 10 MMOL/L (ref 7–16)
BASOPHILS # BLD: 0.02 E9/L (ref 0–0.2)
BASOPHILS NFR BLD: 0.3 % (ref 0–2)
BUN SERPL-MCNC: 16 MG/DL (ref 6–20)
CALCIUM SERPL-MCNC: 8.8 MG/DL (ref 8.6–10.2)
CHLORIDE SERPL-SCNC: 107 MMOL/L (ref 98–107)
CO2 SERPL-SCNC: 21 MMOL/L (ref 22–29)
CREAT SERPL-MCNC: 1.1 MG/DL (ref 0.5–1)
EBV VCA AB SER QL: NEGATIVE
EOSINOPHIL # BLD: 0.09 E9/L (ref 0.05–0.5)
EOSINOPHIL NFR BLD: 1.3 % (ref 0–6)
ERYTHROCYTE [DISTWIDTH] IN BLOOD BY AUTOMATED COUNT: 13.2 FL (ref 11.5–15)
GLUCOSE SERPL-MCNC: 99 MG/DL (ref 74–99)
HCT VFR BLD AUTO: 34.8 % (ref 34–48)
HGB BLD-MCNC: 12.1 G/DL (ref 11.5–15.5)
IMM GRANULOCYTES # BLD: 0.02 E9/L
IMM GRANULOCYTES NFR BLD: 0.3 % (ref 0–5)
INFLUENZA A: NOT DETECTED
INFLUENZA B: NOT DETECTED
LYMPHOCYTES # BLD: 2.51 E9/L (ref 1.5–4)
LYMPHOCYTES NFR BLD: 35.8 % (ref 20–42)
MCH RBC QN AUTO: 30.8 PG (ref 26–35)
MCHC RBC AUTO-ENTMCNC: 34.8 % (ref 32–34.5)
MCV RBC AUTO: 88.5 FL (ref 80–99.9)
MONOCYTES # BLD: 0.73 E9/L (ref 0.1–0.95)
MONOCYTES NFR BLD: 10.4 % (ref 2–12)
NEUTROPHILS # BLD: 3.65 E9/L (ref 1.8–7.3)
NEUTS SEG NFR BLD: 51.9 % (ref 43–80)
PLATELET # BLD AUTO: 278 E9/L (ref 130–450)
PMV BLD AUTO: 10 FL (ref 7–12)
POTASSIUM SERPL-SCNC: 4.3 MMOL/L (ref 3.5–5)
RBC # BLD AUTO: 3.93 E12/L (ref 3.5–5.5)
SARS-COV-2 RNA, RT PCR: NOT DETECTED
SODIUM SERPL-SCNC: 138 MMOL/L (ref 132–146)
STREP GRP A PCR: POSITIVE
WBC # BLD: 7 E9/L (ref 4.5–11.5)

## 2023-03-29 PROCEDURE — 36415 COLL VENOUS BLD VENIPUNCTURE: CPT

## 2023-03-29 PROCEDURE — 87636 SARSCOV2 & INF A&B AMP PRB: CPT

## 2023-03-29 PROCEDURE — 80048 BASIC METABOLIC PNL TOTAL CA: CPT

## 2023-03-29 PROCEDURE — 6370000000 HC RX 637 (ALT 250 FOR IP): Performed by: STUDENT IN AN ORGANIZED HEALTH CARE EDUCATION/TRAINING PROGRAM

## 2023-03-29 PROCEDURE — 2580000003 HC RX 258: Performed by: STUDENT IN AN ORGANIZED HEALTH CARE EDUCATION/TRAINING PROGRAM

## 2023-03-29 PROCEDURE — 6360000002 HC RX W HCPCS: Performed by: STUDENT IN AN ORGANIZED HEALTH CARE EDUCATION/TRAINING PROGRAM

## 2023-03-29 PROCEDURE — 86308 HETEROPHILE ANTIBODY SCREEN: CPT

## 2023-03-29 PROCEDURE — 6360000004 HC RX CONTRAST MEDICATION: Performed by: STUDENT IN AN ORGANIZED HEALTH CARE EDUCATION/TRAINING PROGRAM

## 2023-03-29 PROCEDURE — 96374 THER/PROPH/DIAG INJ IV PUSH: CPT

## 2023-03-29 PROCEDURE — 96375 TX/PRO/DX INJ NEW DRUG ADDON: CPT

## 2023-03-29 PROCEDURE — 87880 STREP A ASSAY W/OPTIC: CPT

## 2023-03-29 PROCEDURE — 99285 EMERGENCY DEPT VISIT HI MDM: CPT

## 2023-03-29 PROCEDURE — 85025 COMPLETE CBC W/AUTO DIFF WBC: CPT

## 2023-03-29 PROCEDURE — 70491 CT SOFT TISSUE NECK W/DYE: CPT

## 2023-03-29 RX ORDER — AMOXICILLIN AND CLAVULANATE POTASSIUM 875; 125 MG/1; MG/1
1 TABLET, FILM COATED ORAL ONCE
Status: DISCONTINUED | OUTPATIENT
Start: 2023-03-29 | End: 2023-03-29

## 2023-03-29 RX ORDER — DEXAMETHASONE SODIUM PHOSPHATE 4 MG/ML
10 INJECTION, SOLUTION INTRA-ARTICULAR; INTRALESIONAL; INTRAMUSCULAR; INTRAVENOUS; SOFT TISSUE ONCE
Status: COMPLETED | OUTPATIENT
Start: 2023-03-29 | End: 2023-03-29

## 2023-03-29 RX ORDER — AMOXICILLIN 250 MG/1
500 CAPSULE ORAL ONCE
Status: COMPLETED | OUTPATIENT
Start: 2023-03-29 | End: 2023-03-29

## 2023-03-29 RX ORDER — SODIUM CHLORIDE 0.9 % (FLUSH) 0.9 %
10 SYRINGE (ML) INJECTION PRN
Status: COMPLETED | OUTPATIENT
Start: 2023-03-29 | End: 2023-03-29

## 2023-03-29 RX ORDER — IBUPROFEN 800 MG/1
800 TABLET ORAL EVERY 8 HOURS PRN
Qty: 30 TABLET | Refills: 0 | Status: SHIPPED | OUTPATIENT
Start: 2023-03-29

## 2023-03-29 RX ORDER — AMOXICILLIN 500 MG/1
500 CAPSULE ORAL 2 TIMES DAILY
Qty: 20 CAPSULE | Refills: 0 | Status: SHIPPED | OUTPATIENT
Start: 2023-03-29 | End: 2023-04-08

## 2023-03-29 RX ORDER — KETOROLAC TROMETHAMINE 30 MG/ML
15 INJECTION, SOLUTION INTRAMUSCULAR; INTRAVENOUS ONCE
Status: COMPLETED | OUTPATIENT
Start: 2023-03-29 | End: 2023-03-29

## 2023-03-29 RX ADMIN — KETOROLAC TROMETHAMINE 15 MG: 30 INJECTION, SOLUTION INTRAMUSCULAR at 03:40

## 2023-03-29 RX ADMIN — DEXAMETHASONE SODIUM PHOSPHATE 10 MG: 4 INJECTION, SOLUTION INTRAMUSCULAR; INTRAVENOUS at 02:56

## 2023-03-29 RX ADMIN — AMOXICILLIN 500 MG: 250 CAPSULE ORAL at 03:40

## 2023-03-29 RX ADMIN — IOPAMIDOL 75 ML: 755 INJECTION, SOLUTION INTRAVENOUS at 02:52

## 2023-03-29 RX ADMIN — SODIUM CHLORIDE, PRESERVATIVE FREE 10 ML: 5 INJECTION INTRAVENOUS at 02:51

## 2023-03-29 ASSESSMENT — ENCOUNTER SYMPTOMS
ABDOMINAL PAIN: 0
COLOR CHANGE: 0
VOMITING: 0
DIARRHEA: 0
NAUSEA: 0
SHORTNESS OF BREATH: 0
COUGH: 0
BACK PAIN: 0

## 2023-03-29 ASSESSMENT — LIFESTYLE VARIABLES
HOW MANY STANDARD DRINKS CONTAINING ALCOHOL DO YOU HAVE ON A TYPICAL DAY: PATIENT DOES NOT DRINK
HOW OFTEN DO YOU HAVE A DRINK CONTAINING ALCOHOL: NEVER

## 2023-03-29 NOTE — Clinical Note
Andreas Simpson was seen and treated in our emergency department on 3/29/2023. She may return to work on 03/30/2023. If you have any questions or concerns, please don't hesitate to call.       Hugo Tesfaye, DO

## 2023-03-29 NOTE — ED PROVIDER NOTES
Heart sounds: Normal heart sounds. Pulmonary:      Effort: Pulmonary effort is normal. No respiratory distress. Breath sounds: Normal breath sounds. Abdominal:      General: Bowel sounds are normal. There is no distension. Tenderness: There is no abdominal tenderness. Musculoskeletal:         General: Normal range of motion. Cervical back: Normal range of motion and neck supple. Skin:     General: Skin is warm and dry. Capillary Refill: Capillary refill takes less than 2 seconds. Neurological:      General: No focal deficit present. Mental Status: She is alert. Procedures     MDM  60-year-old female patient presents to emergency department for evaluation of sore throat, body aches. Today she felt throat swelling and closing sensation when she lays flat. Not on any antibiotics. She denies any rash she is not on any ACE or ARB. Strep swab was found to be positive. White count normal as interpreted by me, 7.0. No acute kidney injury. No evidence for dehydration. Lites within normal limits including sodium of 138 and potassium of 4.3. Her monotest was negative. Her COVID and flu swab was negative as well. Due to patient complaint of throat swelling I had obtain CT soft tissue neck with IV contrast.  CT scan shows Heterogeneous tonsils consistent with tonsillitis, no abscess. At this time patient was given a dose of Decadron here for swelling, Toradol for pain and amoxicillin for her Strep throat. Patient tolerated oral fluids here. Patient stable for discharge she was given return precautions if she develops worsening fever, shortness of breath unable to take fluids then she must return immediately. Patient agreeable with plan.       Medications   dexamethasone (DECADRON) injection 10 mg (10 mg IntraVENous Given 3/29/23 0256)   iopamidol (ISOVUE-370) 76 % injection 75 mL (75 mLs IntraVENous Given 3/29/23 0252)   sodium chloride flush 0.9 % injection 10 mL (10 mLs Result   1. Heterogeneous palatine tonsils suggest tonsillitis. No peritonsillar   abscess. 2.  Multiple mildly prominent bilateral cervical lymph nodes are likely   reactive.             ------------------------- NURSING NOTES AND VITALS REVIEWED ---------------------------  Date / Time Roomed:  3/29/2023  1:26 AM  ED Bed Assignment:  11/11    The nursing notes within the ED encounter and vital signs as below have been reviewed. /79   Pulse 72   Temp 97.9 °F (36.6 °C) (Oral)   Resp 16   Ht 4' 11\" (1.499 m)   Wt 165 lb (74.8 kg)   SpO2 98%   BMI 33.33 kg/m²   Oxygen Saturation Interpretation: Normal      --------------------------------- ADDITIONAL PROVIDER NOTES ---------------------------------  At this time the patient is without objective evidence of an acute process requiring hospitalization or inpatient management. They have remained hemodynamically stable throughout their entire ED visit and are stable for discharge with outpatient follow-up. The plan has been discussed in detail and they are aware of the specific conditions for emergent return, as well as the importance of follow-up. New Prescriptions    AMOXICILLIN (AMOXIL) 500 MG CAPSULE    Take 1 capsule by mouth 2 times daily for 10 days    IBUPROFEN (ADVIL;MOTRIN) 800 MG TABLET    Take 1 tablet by mouth every 8 hours as needed for Pain       Diagnosis:  1. Streptococcal sore throat        Disposition:  Patient's disposition: Discharge to home  Patient's condition is stable.           Felipe Shetty DO  Resident  03/29/23 1848

## 2023-04-18 ENCOUNTER — HOSPITAL ENCOUNTER (OUTPATIENT)
Dept: GENERAL RADIOLOGY | Age: 43
Discharge: HOME | End: 2023-04-20
Payer: COMMERCIAL

## 2023-04-18 ENCOUNTER — HOSPITAL ENCOUNTER (OUTPATIENT)
Age: 43
Discharge: HOME | End: 2023-04-20
Payer: COMMERCIAL

## 2023-04-18 DIAGNOSIS — M25.562 LEFT KNEE PAIN, UNSPECIFIED CHRONICITY: ICD-10-CM

## 2023-04-18 PROCEDURE — 73562 X-RAY EXAM OF KNEE 3: CPT

## 2023-04-26 ENCOUNTER — OFFICE VISIT (OUTPATIENT)
Dept: OBGYN | Age: 43
End: 2023-04-26
Payer: COMMERCIAL

## 2023-04-26 VITALS
HEIGHT: 59 IN | BODY MASS INDEX: 34.88 KG/M2 | SYSTOLIC BLOOD PRESSURE: 117 MMHG | DIASTOLIC BLOOD PRESSURE: 69 MMHG | WEIGHT: 173 LBS | TEMPERATURE: 71 F

## 2023-04-26 DIAGNOSIS — N94.9 VAGINAL BURNING: ICD-10-CM

## 2023-04-26 DIAGNOSIS — N93.9 ABNORMAL UTERINE BLEEDING: ICD-10-CM

## 2023-04-26 DIAGNOSIS — R10.2 PELVIC PAIN: Primary | ICD-10-CM

## 2023-04-26 PROCEDURE — 99213 OFFICE O/P EST LOW 20 MIN: CPT | Performed by: OBSTETRICS & GYNECOLOGY

## 2023-04-26 PROCEDURE — 1036F TOBACCO NON-USER: CPT | Performed by: OBSTETRICS & GYNECOLOGY

## 2023-04-26 PROCEDURE — G8427 DOCREV CUR MEDS BY ELIG CLIN: HCPCS | Performed by: OBSTETRICS & GYNECOLOGY

## 2023-04-26 PROCEDURE — G8417 CALC BMI ABV UP PARAM F/U: HCPCS | Performed by: OBSTETRICS & GYNECOLOGY

## 2023-04-26 RX ORDER — MEDROXYPROGESTERONE ACETATE 10 MG/1
10 TABLET ORAL DAILY
Qty: 30 TABLET | Refills: 3 | Status: SHIPPED | OUTPATIENT
Start: 2023-04-26

## 2023-04-26 NOTE — PROGRESS NOTES
Patient alert and pleasant with complaints of leg pain before menses  Here today for follow up  Discharge instructions have been discussed with the patient. Patient advised to call our office with any questions or concerns. Voiced understanding.

## 2023-04-26 NOTE — PROGRESS NOTES
Harriet Chun     Patient presents for follow up. Patient was seen in December with multiple complaints. One being irregular bleeding. She had a benign EMB in February. Proliferative endometrium noted. Patient with long history of pelvic pain and cramping. (See note from 22). States she also has significant leg pain. It will start a couple days prior to her cycle and stop when her cycles begins. She will not be able to bend her knee or walk up steps. Discussed suppression of cycles to see if symptoms are relieved. Hormones through pills, nuvaring, IUD reviewed. Patient does not want any of these options. Again discussed returning to Dr. Remigio Velasco and proceeding with pelvic floor PT. Patient declines. Patient has essure coils in place. States she gets significant itching of her skin and hypersensitivity in her labias prior to cycles. She wants a hysterectomy to relieve all the above mentioned symptoms. Patient wants to have her surgery at [facility]. Discussed establishing with a gyn at [facility]. Patient had complaints of vaginal burning and discomfort. She had negative cultures. She was given trial of boric acid suppositories and premarin cream. Patient states she did not try it. She does not want to try it. Past Medical History:   Diagnosis Date    Bronchitis     Dental caries         Past Surgical History:   Procedure Laterality Date     SECTION      INDUCED   2012    TUBAL LIGATION          No family history on file.      Social History       Tobacco History       Smoking Status  Former      Smokeless Tobacco Use  Never              Alcohol History       Alcohol Use Status  No              Drug Use       Drug Use Status  Yes Types  Marijuana (Weed) Comment  occasionally              Sexual Activity       Sexually Active  Not Currently Partners  Male                      Current Outpatient Medications:     medroxyPROGESTERone (PROVERA) 10 MG tablet, Take 1 tablet by

## 2023-05-13 ENCOUNTER — HOSPITAL ENCOUNTER (EMERGENCY)
Age: 43
Discharge: HOME | End: 2023-05-13
Attending: EMERGENCY MEDICINE
Payer: COMMERCIAL

## 2023-05-13 VITALS
WEIGHT: 170 LBS | TEMPERATURE: 97.9 F | HEIGHT: 59 IN | BODY MASS INDEX: 34.27 KG/M2 | OXYGEN SATURATION: 99 % | SYSTOLIC BLOOD PRESSURE: 140 MMHG | RESPIRATION RATE: 16 BRPM | DIASTOLIC BLOOD PRESSURE: 97 MMHG | HEART RATE: 76 BPM

## 2023-05-13 DIAGNOSIS — T18.108A FOREIGN BODY IN ESOPHAGUS, INITIAL ENCOUNTER: Primary | ICD-10-CM

## 2023-05-13 DIAGNOSIS — K20.90 ESOPHAGITIS: ICD-10-CM

## 2023-05-13 PROCEDURE — 99283 EMERGENCY DEPT VISIT LOW MDM: CPT

## 2023-05-13 PROCEDURE — 6370000000 HC RX 637 (ALT 250 FOR IP): Performed by: EMERGENCY MEDICINE

## 2023-05-13 RX ORDER — ONDANSETRON 4 MG/1
8 TABLET, ORALLY DISINTEGRATING ORAL 3 TIMES DAILY PRN
Qty: 14 TABLET | Refills: 0 | Status: SHIPPED | OUTPATIENT
Start: 2023-05-13

## 2023-05-13 RX ADMIN — ALUMINUM HYDROXIDE, MAGNESIUM HYDROXIDE, AND SIMETHICONE: 200; 200; 20 SUSPENSION ORAL at 19:37

## 2023-05-13 NOTE — ED PROVIDER NOTES
HPI:  23, Time: 7:19 PM EDT        Mary Cevallos is a 43 y.o. female presenting to the ED for ***, beginning *** ago. The complaint has been {Desc; intermittent/persistent/constant:94709}, {DESC; MILD/MOD/SEVERE:33247} in severity, and worsened by {Modify factor:90954}. ***    Review of Systems:   A complete review of systems was performed and pertinent positives and negatives are stated within HPI, all other systems reviewed and are negative.      --------------------------------------------- PAST HISTORY ---------------------------------------------  Past Medical History:  has a past medical history of Bronchitis and Dental caries. Past Surgical History:  has a past surgical history that includes Induced  (2012);  section; and Tubal ligation. Social History:  reports that she has quit smoking. She has never used smokeless tobacco. She reports current drug use. Drug: Marijuana [place]). She reports that she does not drink alcohol. Family History: family history is not on file. The patients home medications have been reviewed. Allergies: Latex    -------------------------------------------------- RESULTS -------------------------------------------------  All laboratory and radiology results have been personally reviewed by myself   LABS:  No results found for this visit on 23. RADIOLOGY:  Interpreted by Radiologist.  No orders to display       ------------------------- NURSING NOTES AND VITALS REVIEWED ---------------------------     The nursing notes within the ED encounter and vital signs as below have been reviewed.    BP (!) 140/97   Pulse 76   Temp 97.9 °F (36.6 °C)   Resp 16   Ht 4' 11\" (1.499 m)   Wt 170 lb (77.1 kg)   LMP 2023   SpO2 99%   BMI 34.34 kg/m²   Oxygen Saturation Interpretation: Normal    ---------------------------------------------------PHYSICAL EXAM--------------------------------------    Constitutional/General: Alert and

## 2023-07-25 ENCOUNTER — TELEPHONE (OUTPATIENT)
Dept: OBGYN | Age: 43
End: 2023-07-25

## 2023-07-25 NOTE — TELEPHONE ENCOUNTER
Patient called in stating that she had a period on 7/3 and has now started another one. Has questions regarding this.

## 2023-08-01 DIAGNOSIS — N93.9 ABNORMAL UTERINE BLEEDING: Primary | ICD-10-CM

## 2023-08-15 ENCOUNTER — HOSPITAL ENCOUNTER (OUTPATIENT)
Age: 43
Discharge: HOME | End: 2023-08-15
Attending: OBSTETRICS & GYNECOLOGY
Payer: COMMERCIAL

## 2023-08-15 ENCOUNTER — HOSPITAL ENCOUNTER (OUTPATIENT)
Dept: ULTRASOUND IMAGING | Age: 43
Discharge: HOME | End: 2023-08-17
Attending: OBSTETRICS & GYNECOLOGY
Payer: COMMERCIAL

## 2023-08-15 DIAGNOSIS — N93.9 ABNORMAL UTERINE BLEEDING: ICD-10-CM

## 2023-08-15 LAB
B-HCG SERPL EIA 3RD IS-ACNC: <0.1 MIU/ML (ref 0–7)
ERYTHROCYTE [DISTWIDTH] IN BLOOD BY AUTOMATED COUNT: 13.1 % (ref 11.5–15)
HCT VFR BLD AUTO: 35.3 % (ref 34–48)
HGB BLD-MCNC: 12.1 G/DL (ref 11.5–15.5)
MCH RBC QN AUTO: 30.5 PG (ref 26–35)
MCHC RBC AUTO-ENTMCNC: 34.3 G/DL (ref 32–34.5)
MCV RBC AUTO: 88.9 FL (ref 80–99.9)
PLATELET # BLD AUTO: 248 K/UL (ref 130–450)
PMV BLD AUTO: 10.3 FL (ref 7–12)
PROLACTIN SERPL-MCNC: 109.2 NG/ML
RBC # BLD AUTO: 3.97 M/UL (ref 3.5–5.5)
TSH SERPL DL<=0.05 MIU/L-ACNC: 1.33 UIU/ML (ref 0.27–4.2)
WBC OTHER # BLD: 6.5 K/UL (ref 4.5–11.5)

## 2023-08-15 PROCEDURE — 36415 COLL VENOUS BLD VENIPUNCTURE: CPT

## 2023-08-15 PROCEDURE — 84702 CHORIONIC GONADOTROPIN TEST: CPT

## 2023-08-15 PROCEDURE — 84146 ASSAY OF PROLACTIN: CPT

## 2023-08-15 PROCEDURE — 84443 ASSAY THYROID STIM HORMONE: CPT

## 2023-08-15 PROCEDURE — 85027 COMPLETE CBC AUTOMATED: CPT

## 2023-08-15 PROCEDURE — 76830 TRANSVAGINAL US NON-OB: CPT

## 2023-08-15 PROCEDURE — 76856 US EXAM PELVIC COMPLETE: CPT

## 2023-08-17 ENCOUNTER — TELEPHONE (OUTPATIENT)
Dept: ADMINISTRATIVE | Age: 43
End: 2023-08-17

## 2023-08-17 NOTE — TELEPHONE ENCOUNTER
Pt wants to speak to office. Pt is trying to reach office about her lab/test results. Please call pt back. Thanks!

## 2023-08-18 ENCOUNTER — TELEPHONE (OUTPATIENT)
Dept: OBGYN | Age: 43
End: 2023-08-18

## 2023-08-22 ENCOUNTER — SCHEDULED TELEPHONE ENCOUNTER (OUTPATIENT)
Dept: OBGYN | Age: 43
End: 2023-08-22

## 2023-08-22 DIAGNOSIS — E22.1 HYPERPROLACTINEMIA (HCC): Primary | ICD-10-CM

## 2023-08-22 PROCEDURE — VIRTUALHLTH VIRTUAL HEALTH SAME DAY: Performed by: OBSTETRICS & GYNECOLOGY

## 2023-08-22 NOTE — PROGRESS NOTES
Key Lea is a 43 y.o. female evaluated via telephone on 8/22/2023 for Results  . Documentation:  Patient had called in with complaints of irregular bleeding. She had been seen in the past with these complaints as well. Labs ordered that showed prolactin 109. Discussed possible causes, including pituitary adenoma. Recommend brain MRI to evaluate the pituitary, as well as referral to endocrinology. Patient agrees. Reviewed pelvic ultrasound. All questions were answered. Total Time: minutes: 5-10 minutes    Key Lea was evaluated through a synchronous (real-time) audio encounter. Patient identification was verified at the start of the visit. She (or guardian if applicable) is aware that this is a billable service, which includes applicable co-pays. This visit was conducted with the patient's (and/or legal guardian's) verbal consent. She has not had a related appointment within my department in the past 7 days or scheduled within the next 24 hours. The patient was located at Home: [address] [place] [address]. The provider was located at [facility] (Appt Dept): [address] [facility],  [address] [place] [address],[address].     Note: not billable if this call serves to triage the patient into an appointment for the relevant concern    Leon Florez MD

## 2023-09-05 ENCOUNTER — TELEPHONE (OUTPATIENT)
Dept: OBGYN | Age: 43
End: 2023-09-05

## 2023-09-05 NOTE — TELEPHONE ENCOUNTER
Received a call from MRI scheduling, they need the MRI Brain to be with and without contrast.  Please re-order

## 2023-09-06 ENCOUNTER — HOSPITAL ENCOUNTER (OUTPATIENT)
Dept: MRI IMAGING | Age: 43
Discharge: HOME | End: 2023-09-08
Attending: OBSTETRICS & GYNECOLOGY

## 2023-09-06 DIAGNOSIS — E22.1 HYPERPROLACTINEMIA (HCC): ICD-10-CM

## 2023-09-06 DIAGNOSIS — E22.1 HYPERPROLACTINEMIA (HCC): Primary | ICD-10-CM

## 2023-09-28 ENCOUNTER — HOSPITAL ENCOUNTER (OUTPATIENT)
Dept: MRI IMAGING | Age: 43
Discharge: HOME | End: 2023-09-30
Attending: OBSTETRICS & GYNECOLOGY
Payer: COMMERCIAL

## 2023-09-28 DIAGNOSIS — E22.1 HYPERPROLACTINEMIA (HCC): ICD-10-CM

## 2023-09-28 PROCEDURE — 70553 MRI BRAIN STEM W/O & W/DYE: CPT

## 2023-09-28 PROCEDURE — A9579 GAD-BASE MR CONTRAST NOS,1ML: HCPCS | Performed by: RADIOLOGY

## 2023-09-28 PROCEDURE — 6360000004 HC RX CONTRAST MEDICATION: Performed by: RADIOLOGY

## 2023-09-28 RX ADMIN — GADOTERIDOL 15 ML: 279.3 INJECTION, SOLUTION INTRAVENOUS at 13:17

## 2023-10-02 ENCOUNTER — TELEPHONE (OUTPATIENT)
Dept: OBGYN | Age: 43
End: 2023-10-02

## 2023-10-02 NOTE — TELEPHONE ENCOUNTER
Called patient in regards to her MRI \. This office received letter stating the MRI is not covered.   Patient did have MRI and states that doctor needs to send more info as to why this MRI was necessary   Please advise

## 2023-10-03 ENCOUNTER — TELEPHONE (OUTPATIENT)
Dept: OBGYN | Age: 43
End: 2023-10-03

## 2023-10-03 NOTE — TELEPHONE ENCOUNTER
Can you please the last notes from office visit and send to insurance company. They discuss hyperprolactinemia and the need for MRI. Thank you.

## 2023-10-03 NOTE — TELEPHONE ENCOUNTER
Pt called had MRI done Thursday and wants results. Also pt needs something for bacterial infection. Pharmacy is [facility] in [facility].

## 2023-10-05 DIAGNOSIS — E22.1 HYPERPROLACTINEMIA (HCC): Primary | ICD-10-CM

## 2023-10-05 DIAGNOSIS — D35.2 PITUITARY MICROADENOMA (HCC): ICD-10-CM

## 2023-10-05 RX ORDER — METRONIDAZOLE 500 MG/1
500 TABLET ORAL 2 TIMES DAILY
Qty: 14 TABLET | Refills: 0 | Status: SHIPPED | OUTPATIENT
Start: 2023-10-05 | End: 2023-10-12

## 2023-10-05 NOTE — PROGRESS NOTES
Telephone call     Patient was seen for abnormal uterine bleeding. Labs were drawn and patient had a prolactin level of 109. Brain MRI showed a pituitary microadenoma. Discussed with patient microadenoma as cause of high prolactin level. Patient has not heard from endocrinology yet for an appointment. Referral was sent again today. Advised patient to call in 1 week if she does not hear from that office. Patient states she has a bacterial infection, requesting medication. Flagyl sent to the pharmacy. All questions were answered.      Samantha Rocha MD

## 2023-10-11 ENCOUNTER — TELEPHONE (OUTPATIENT)
Dept: ADMINISTRATIVE | Age: 43
End: 2023-10-11

## 2023-10-12 NOTE — TELEPHONE ENCOUNTER
Called patient and informed her she had a biopsy done in February which was mitchnin. Patient did have questions she would like to discuss with Dr. Constance Estevez. Appointment made.

## 2023-11-20 ENCOUNTER — OFFICE VISIT (OUTPATIENT)
Dept: OBGYN | Age: 43
End: 2023-11-20

## 2023-11-20 ENCOUNTER — TELEPHONE (OUTPATIENT)
Dept: ADMINISTRATIVE | Age: 43
End: 2023-11-20

## 2023-11-20 VITALS
HEIGHT: 59 IN | DIASTOLIC BLOOD PRESSURE: 65 MMHG | SYSTOLIC BLOOD PRESSURE: 122 MMHG | HEART RATE: 90 BPM | BODY MASS INDEX: 34.47 KG/M2 | WEIGHT: 171 LBS

## 2023-11-20 DIAGNOSIS — Z12.4 SCREENING FOR CERVICAL CANCER: ICD-10-CM

## 2023-11-20 DIAGNOSIS — Z12.31 ENCOUNTER FOR SCREENING MAMMOGRAM FOR BREAST CANCER: Primary | ICD-10-CM

## 2023-11-20 DIAGNOSIS — Z01.419 ENCOUNTER FOR WELL WOMAN EXAM: ICD-10-CM

## 2023-11-20 DIAGNOSIS — E22.1 HYPERPROLACTINEMIA (HCC): ICD-10-CM

## 2023-11-20 DIAGNOSIS — D35.2 PROLACTINOMA (HCC): ICD-10-CM

## 2023-11-20 DIAGNOSIS — N93.9 ABNORMAL UTERINE BLEEDING: ICD-10-CM

## 2023-11-20 DIAGNOSIS — R10.2 PELVIC PAIN: ICD-10-CM

## 2023-11-20 NOTE — PROGRESS NOTES
Bowen Chun     Patient presents for annual exam/ problem visit. Patient with complaints of breast pain and leakage of fluid from her nipples. Leakage is only with manual expression and clear fluid. Discussed that this is due to her prolactinoma and hyperprolactinemia. Patient could not get an appointment with endocrinology until April. Discussed sending a referral to a different doctor to possibly get in sooner. Patient agrees. Patient is due for a mammogram.     Patient still with heavy cycles and significant cramps/ pelvic pain. Patient was offered daily provera to help control cycles at her visit in September. Patient did not start the medication. She does not want to take anything at this time. Patient still would like a hysterectomy but wants it in [place]. She did not schedule an appointment yet. Patient is requesting a referral. Will place a referral today. Past Medical History:   Diagnosis Date    Bronchitis     Dental caries         Past Surgical History:   Procedure Laterality Date     SECTION      INDUCED   2012    TUBAL LIGATION          No family history on file.      Social History       Tobacco History       Smoking Status  Former      Smokeless Tobacco Use  Never              Alcohol History       Alcohol Use Status  No              Drug Use       Drug Use Status  Yes Types  Marijuana (Weed) Comment  occasionally              Sexual Activity       Sexually Active  Not Currently Partners  Male                      Current Outpatient Medications:     ibuprofen (ADVIL;MOTRIN) 800 MG tablet, Take 1 tablet by mouth every 8 hours as needed for Pain, Disp: 30 tablet, Rfl: 0    FEROSUL 325 (65 Fe) MG tablet, take 1 tablet by mouth twice a day, Disp: , Rfl:     albuterol (PROVENTIL) (2.5 MG/3ML) 0.083% nebulizer solution, Take 3 mLs by nebulization every 6 hours as needed for Wheezing or Shortness of Breath, Disp: 90 each, Rfl: 0    albuterol-ipratropium (COMBIVENT

## 2023-11-20 NOTE — PROGRESS NOTES
Patient alert and pleasant with complaints of pain and breast tenderness with leaking fluid per patient   Here today for annual GYN exam.  Pelvic exam, pap smear obtained, labeled  and delivered to lab. Breast exam completed by doctor. Discharge instructions have been discussed with the patient. Patient advised to call our office with any questions or concerns. Voiced understanding.

## 2023-11-20 NOTE — TELEPHONE ENCOUNTER
Pt requested that a work excuse for today be emailed to her at [email or web address]. Attempted to contact the office, call did not go through. Please email or contact pt if any problems.

## 2023-11-27 LAB — GYNECOLOGY CYTOLOGY REPORT: NORMAL

## 2024-02-09 ENCOUNTER — TELEPHONE (OUTPATIENT)
Dept: OBGYN | Age: 44
End: 2024-02-09

## 2024-02-09 RX ORDER — FLUCONAZOLE 150 MG/1
150 TABLET ORAL DAILY
Qty: 1 TABLET | Refills: 1 | Status: SHIPPED | OUTPATIENT
Start: 2024-02-09 | End: 2024-02-10

## 2024-02-09 NOTE — TELEPHONE ENCOUNTER
Patient called in requesting a prescription for Diflucan, saw Dr. Lange 11/23.  Are you able to phone this in for her?

## 2024-05-16 ENCOUNTER — TELEPHONE (OUTPATIENT)
Dept: OBGYN | Age: 44
End: 2024-05-16

## 2024-05-16 NOTE — TELEPHONE ENCOUNTER
Patient called in stating that when she saw Dr. Lange in November that she was to order a pelvic ultrasound.  Does she need to be seen by a different provider or do I just send a message to one of them to see if they will order it?  Please advise

## 2024-05-18 ENCOUNTER — TRANSCRIBE ORDERS (OUTPATIENT)
Dept: ADMINISTRATIVE | Age: 44
End: 2024-05-18

## 2024-05-18 DIAGNOSIS — R07.89 CHEST PAIN, RADIATING: Primary | ICD-10-CM

## 2024-05-18 DIAGNOSIS — M94.0 COSTOCHONDRAL JUNCTION SYNDROME: ICD-10-CM

## 2024-05-20 ENCOUNTER — TELEPHONE (OUTPATIENT)
Dept: OBGYN | Age: 44
End: 2024-05-20

## 2024-05-20 NOTE — TELEPHONE ENCOUNTER
Patient was seen by Dr. Lange on 04/26/2024. Patient states a Pelvic US order was supposed to be placed. No order in chart. Would you be willing to send an order for the US?

## 2024-05-21 ENCOUNTER — HOSPITAL ENCOUNTER (EMERGENCY)
Age: 44
Discharge: HOME | End: 2024-05-21
Attending: EMERGENCY MEDICINE
Payer: COMMERCIAL

## 2024-05-21 VITALS
DIASTOLIC BLOOD PRESSURE: 82 MMHG | SYSTOLIC BLOOD PRESSURE: 121 MMHG | HEART RATE: 74 BPM | BODY MASS INDEX: 36.32 KG/M2 | OXYGEN SATURATION: 97 % | WEIGHT: 179.8 LBS | TEMPERATURE: 97.8 F | RESPIRATION RATE: 16 BRPM

## 2024-05-21 DIAGNOSIS — K64.4 EXTERNAL HEMORRHOIDS: Primary | ICD-10-CM

## 2024-05-21 DIAGNOSIS — R10.2 PELVIC PAIN: ICD-10-CM

## 2024-05-21 LAB
BILIRUB UR QL STRIP: NEGATIVE
CLARITY UR: CLEAR
COLOR UR: YELLOW
GLUCOSE UR STRIP-MCNC: NEGATIVE MG/DL
HCG UR QL: NEGATIVE
HGB UR QL STRIP.AUTO: NEGATIVE
KETONES UR STRIP-MCNC: NEGATIVE MG/DL
LEUKOCYTE ESTERASE UR QL STRIP: NEGATIVE
NITRITE UR QL STRIP: NEGATIVE
PH UR STRIP: 7 [PH] (ref 5–9)
PROT UR STRIP-MCNC: NEGATIVE MG/DL
RBC #/AREA URNS HPF: NORMAL /HPF
SP GR UR STRIP: 1.02 (ref 1–1.03)
UROBILINOGEN UR STRIP-ACNC: 0.2 EU/DL (ref 0–1)
WBC #/AREA URNS HPF: NORMAL /HPF

## 2024-05-21 PROCEDURE — 81001 URINALYSIS AUTO W/SCOPE: CPT

## 2024-05-21 PROCEDURE — 99283 EMERGENCY DEPT VISIT LOW MDM: CPT

## 2024-05-21 PROCEDURE — 84703 CHORIONIC GONADOTROPIN ASSAY: CPT

## 2024-05-21 RX ORDER — IBUPROFEN 600 MG/1
600 TABLET ORAL 3 TIMES DAILY PRN
Qty: 30 TABLET | Refills: 0 | Status: SHIPPED | OUTPATIENT
Start: 2024-05-21

## 2024-05-21 RX ORDER — DICYCLOMINE HYDROCHLORIDE 10 MG/1
10 CAPSULE ORAL
Qty: 40 CAPSULE | Refills: 0 | Status: SHIPPED | OUTPATIENT
Start: 2024-05-21 | End: 2024-05-31

## 2024-05-21 RX ORDER — IBUPROFEN 400 MG/1
400 TABLET ORAL ONCE
Status: DISCONTINUED | OUTPATIENT
Start: 2024-05-21 | End: 2024-05-21 | Stop reason: HOSPADM

## 2024-05-21 ASSESSMENT — PAIN SCALES - GENERAL
PAINLEVEL_OUTOF10: 7
PAINLEVEL_OUTOF10: 7

## 2024-05-21 ASSESSMENT — PAIN DESCRIPTION - ONSET: ONSET: ON-GOING

## 2024-05-21 ASSESSMENT — PAIN DESCRIPTION - PAIN TYPE: TYPE: ACUTE PAIN

## 2024-05-21 ASSESSMENT — PAIN - FUNCTIONAL ASSESSMENT
PAIN_FUNCTIONAL_ASSESSMENT: PREVENTS OR INTERFERES SOME ACTIVE ACTIVITIES AND ADLS
PAIN_FUNCTIONAL_ASSESSMENT: 0-10

## 2024-05-21 ASSESSMENT — PAIN DESCRIPTION - DESCRIPTORS: DESCRIPTORS: DISCOMFORT;ACHING;NUMBNESS

## 2024-05-21 ASSESSMENT — PAIN DESCRIPTION - LOCATION: LOCATION: PELVIS

## 2024-05-21 ASSESSMENT — PAIN DESCRIPTION - FREQUENCY: FREQUENCY: CONTINUOUS

## 2024-05-21 NOTE — ED PROVIDER NOTES
FEROSUL 325 (65 Fe) MG tablet take 1 tablet by mouth twice a day, DAWHistorical Med      albuterol (PROVENTIL) (2.5 MG/3ML) 0.083% nebulizer solution Take 3 mLs by nebulization every 6 hours as needed for Wheezing or Shortness of Breath, Disp-90 each, R-0Normal      albuterol sulfate HFA (PROAIR HFA) 108 (90 Base) MCG/ACT inhaler Inhale 2 puffs into the lungs every 6 hours as needed for Shortness of Breath, Disp-18 g, R-0Normal      VITAMIN D PO Take by mouthHistorical Med             ALLERGIES     Latex    FAMILYHISTORY     History reviewed. No pertinent family history.     SOCIAL HISTORY       Social History     Tobacco Use    Smoking status: Former    Smokeless tobacco: Never   Vaping Use    Vaping Use: Never used   Substance Use Topics    Alcohol use: No    Drug use: Yes     Types: Marijuana (Weed)     Comment: occasionally       SCREENINGS        [place] Coma Scale  Eye Opening: Spontaneous  Best Verbal Response: Oriented  Best Motor Response: Obeys commands  [place]is Coma Scale Score: 15                CIWA Assessment  BP: 121/82  Pulse: 74           PHYSICAL EXAM  1 or more Elements     ED Triage Vitals   BP Temp Temp Source Pulse Respirations SpO2 Height Weight - Scale   05/21/24 0817 05/21/24 0817 05/21/24 0817 05/21/24 0817 05/21/24 0817 05/21/24 0817 -- 05/21/24 0822   121/82 97.8 °F (36.6 °C) Oral 74 16 97 %  81.6 kg (179 lb 12.8 oz)       Constitutional/General: Alert and oriented x3, no acute distress  Head: Normocephalic and atraumatic  Eyes: PERRL, EOMI, conjunctiva normal, sclera non icteric  Respiratory: Lungs clear to auscultation bilaterally, no wheezes, rales, or rhonchi. Not in respiratory distress  Cardiovascular:  Regular rate. Regular rhythm. 2+ distal pulses. Equal extremity pulses  Chest: No chest wall tenderness  GI:  Abdomen Soft, Non tender to diffuse palpation, Non distended.  No rebound, guarding, or rigidity  : External rectal examination performed with JAILENE Roldan present as

## 2024-05-21 NOTE — DISCHARGE INSTRUCTIONS
Please call and follow up with your family physician as well as your gastroenterologist.  Follow-up with your OB/GYN.  Return to emergency department if symptoms persist or worsen.  Take Bentyl and Motrin as prescribed.    Call and make appointment with OB/Gyn in [place] and you feel necessary -  OB/Gyn office can be reached at the follow number:  [phone]

## 2024-05-22 ENCOUNTER — HOSPITAL ENCOUNTER (OUTPATIENT)
Dept: ULTRASOUND IMAGING | Age: 44
Discharge: HOME | End: 2024-05-24
Payer: COMMERCIAL

## 2024-05-22 DIAGNOSIS — R11.0 NAUSEA: ICD-10-CM

## 2024-05-22 DIAGNOSIS — R10.11 RUQ PAIN: ICD-10-CM

## 2024-05-22 PROCEDURE — 76705 ECHO EXAM OF ABDOMEN: CPT

## 2024-05-28 ENCOUNTER — TELEPHONE (OUTPATIENT)
Dept: OBGYN | Age: 44
End: 2024-05-28

## 2024-05-28 NOTE — TELEPHONE ENCOUNTER
Can you please send ultrasound order for this patient? She has called several times asking that this order be placed.

## 2024-05-29 NOTE — TELEPHONE ENCOUNTER
Subjective:         Patient ID: Italia Kirby is a 71 y.o. female.    Chief Complaint: Low-back Pain        Pain  This is a chronic problem. The current episode started more than 1 year ago. The problem occurs daily. The problem has been waxing and waning. Associated symptoms include arthralgias and neck pain. Pertinent negatives include no anorexia, chest pain, chills, coughing, diaphoresis, fever, sore throat, vertigo or vomiting.   Review of Systems   Constitutional:  Negative for activity change, appetite change, chills, diaphoresis and fever.   HENT:  Negative for drooling, ear discharge, ear pain, facial swelling, nosebleeds, sore throat, trouble swallowing, voice change and goiter.    Eyes:  Negative for photophobia, pain, discharge, redness and visual disturbance.   Respiratory:  Negative for apnea, cough, choking, chest tightness, shortness of breath, wheezing and stridor.    Cardiovascular:  Negative for chest pain, palpitations and leg swelling.   Gastrointestinal:  Negative for abdominal distention, anorexia, diarrhea, rectal pain, vomiting and fecal incontinence.   Endocrine: Negative for cold intolerance, heat intolerance, polydipsia, polyphagia and polyuria.   Genitourinary:  Negative for flank pain, frequency and hot flashes.   Musculoskeletal:  Positive for arthralgias, back pain and neck pain.   Integumentary:  Negative for color change and pallor.   Allergic/Immunologic: Negative for immunocompromised state.   Neurological:  Negative for dizziness, vertigo, seizures, syncope, facial asymmetry, speech difficulty, light-headedness, coordination difficulties, memory loss and coordination difficulties.   Hematological:  Negative for adenopathy. Does not bruise/bleed easily.   Psychiatric/Behavioral:  Negative for agitation, behavioral problems, confusion, decreased concentration, dysphoric mood, hallucinations, self-injury and suicidal ideas. The patient is not nervous/anxious and is not hyperactive.   Appointment scheduled for 06/05/2024           Luis Miguel Smith MD;  Location: [facility] ORTHO OR;  Service: Orthopedics;  Laterality: Left;    EPIDURAL STEROID INJECTION N/A 02/01/2022    Procedure: Injection, Steroid, Epidural, L5/S1;  Surgeon: Jnaie Celis MD;  Location: [facility] PAIN MGMT;  Janie Celis MD;  Location: [facility] PAIN MGMT;  Service: Pain Management;  Laterality: N/A;    EPIDURAL STEROID INJECTION INTO LUMBAR SPINE N/A 08/26/2021    Procedure: L5-S1 EDGAR;  Surgeon: Janie Celis MD;  Location: [facility] PAIN MGMT;  Janie Celis MD;  Location: [facility] PAIN MGMT;  Luis Miguel Smtih MD;  Location: [facility] ORTHO OR;  Service: Orthopedics;  Laterality: Left;    KNEE ARTHROSCOPY W/ MENISCECTOMY Left 02/15/2022    Procedure: ARTHROSCOPY, KNEE, WITH MENISCECTOMY;  Surgeon: Luis Miguel Smith MD;  Location: [facility] ORTHO OR;  Service:  "Orthopedics;  Laterality: Left;    OOPHORECTOMY      TOTAL KNEE REPLACEMENT USING COMPUTER NAVIGATION Left      Social History     Socioeconomic History    Marital status:    Tobacco Use    Smoking status: Former     Packs/day: 1.00     Years: 50.00     Pack years: 50.00     Types: Cigarettes     Start date: 1970     Quit date: 2020     Years since quitting: 3.products Hives        Objective:  Vitals:    02/23/Steve Giron MD Proceduralist    ROSS Roe CRNA, RN Registered Nurse    Candy Bennett Steve Giron MD Proceduralist   ROSS Roe CRNA, CJ Registered Nurse   Candy Bennett EDGAR # 2, lumbosacral radiculopathy    Dr. Celis

## 2024-05-29 NOTE — TELEPHONE ENCOUNTER
I spoke with Mari about her yesterday after I sent this message. I plan on calling her today to let her know that she either needs to make an appointment with our office to be seen or make her referral appointment so that the US can be ordered.

## 2024-05-31 ENCOUNTER — HOSPITAL ENCOUNTER (OUTPATIENT)
Dept: GENERAL RADIOLOGY | Age: 44
Discharge: HOME | End: 2024-05-31
Attending: OBSTETRICS & GYNECOLOGY
Payer: COMMERCIAL

## 2024-05-31 VITALS — WEIGHT: 175 LBS | BODY MASS INDEX: 35.35 KG/M2

## 2024-05-31 DIAGNOSIS — Z12.31 ENCOUNTER FOR SCREENING MAMMOGRAM FOR BREAST CANCER: ICD-10-CM

## 2024-05-31 PROCEDURE — 77063 BREAST TOMOSYNTHESIS BI: CPT

## 2024-06-05 ENCOUNTER — OFFICE VISIT (OUTPATIENT)
Dept: OBGYN | Age: 44
End: 2024-06-05
Payer: COMMERCIAL

## 2024-06-05 VITALS
HEART RATE: 65 BPM | WEIGHT: 176 LBS | SYSTOLIC BLOOD PRESSURE: 120 MMHG | HEIGHT: 59 IN | BODY MASS INDEX: 35.48 KG/M2 | DIASTOLIC BLOOD PRESSURE: 68 MMHG

## 2024-06-05 DIAGNOSIS — R10.2 PELVIC PAIN IN FEMALE: ICD-10-CM

## 2024-06-05 DIAGNOSIS — Z20.2 STD EXPOSURE: ICD-10-CM

## 2024-06-05 DIAGNOSIS — N93.9 ABNORMAL UTERINE BLEEDING (AUB): Primary | ICD-10-CM

## 2024-06-05 DIAGNOSIS — D25.9 UTERINE LEIOMYOMA, UNSPECIFIED LOCATION: ICD-10-CM

## 2024-06-05 PROCEDURE — 99214 OFFICE O/P EST MOD 30 MIN: CPT | Performed by: OBSTETRICS & GYNECOLOGY

## 2024-06-05 PROCEDURE — G8417 CALC BMI ABV UP PARAM F/U: HCPCS | Performed by: OBSTETRICS & GYNECOLOGY

## 2024-06-05 PROCEDURE — 1036F TOBACCO NON-USER: CPT | Performed by: OBSTETRICS & GYNECOLOGY

## 2024-06-05 PROCEDURE — G8427 DOCREV CUR MEDS BY ELIG CLIN: HCPCS | Performed by: OBSTETRICS & GYNECOLOGY

## 2024-06-05 RX ORDER — VALACYCLOVIR HYDROCHLORIDE 500 MG/1
500 TABLET, FILM COATED ORAL 2 TIMES DAILY
Qty: 6 TABLET | Refills: 2 | Status: SHIPPED | OUTPATIENT
Start: 2024-06-05 | End: 2024-06-08

## 2024-06-05 SDOH — ECONOMIC STABILITY: INCOME INSECURITY: HOW HARD IS IT FOR YOU TO PAY FOR THE VERY BASICS LIKE FOOD, HOUSING, MEDICAL CARE, AND HEATING?: NOT HARD AT ALL

## 2024-06-05 SDOH — ECONOMIC STABILITY: HOUSING INSECURITY
IN THE LAST 12 MONTHS, WAS THERE A TIME WHEN YOU DID NOT HAVE A STEADY PLACE TO SLEEP OR SLEPT IN A SHELTER (INCLUDING NOW)?: NO

## 2024-06-05 SDOH — ECONOMIC STABILITY: FOOD INSECURITY: WITHIN THE PAST 12 MONTHS, THE FOOD YOU BOUGHT JUST DIDN'T LAST AND YOU DIDN'T HAVE MONEY TO GET MORE.: NEVER TRUE

## 2024-06-05 SDOH — ECONOMIC STABILITY: FOOD INSECURITY: WITHIN THE PAST 12 MONTHS, YOU WORRIED THAT YOUR FOOD WOULD RUN OUT BEFORE YOU GOT MONEY TO BUY MORE.: NEVER TRUE

## 2024-06-05 ASSESSMENT — PATIENT HEALTH QUESTIONNAIRE - PHQ9
SUM OF ALL RESPONSES TO PHQ QUESTIONS 1-9: 0
2. FEELING DOWN, DEPRESSED OR HOPELESS: NOT AT ALL
SUM OF ALL RESPONSES TO PHQ QUESTIONS 1-9: 0
SUM OF ALL RESPONSES TO PHQ9 QUESTIONS 1 & 2: 0
1. LITTLE INTEREST OR PLEASURE IN DOING THINGS: NOT AT ALL

## 2024-06-05 NOTE — PROGRESS NOTES
Patient alert and pleasant with concerns about irregular menses and fibroids  Would like to discuss having an US   Discharge instructions have been discussed with the patient. Patient advised to call our office with any questions or concerns.   Voiced understanding.   
HIV Screen     Standing Status:   Future     Standing Expiration Date:   6/5/2025    RPR     Standing Status:   Future     Standing Expiration Date:   6/5/2025    MISCELLANEOUS SENDOUT Health Tracks     Standing Status:   Future     Standing Expiration Date:   6/5/2025     Order Specific Question:   Specify Req. Test (1 Test/Order)     Answer:   Health Tracks         Electronically signed by Julissa Kaur DO on 6/5/24

## 2024-06-06 DIAGNOSIS — Z20.2 STD EXPOSURE: ICD-10-CM

## 2024-06-07 RX ORDER — METRONIDAZOLE 7.5 MG/G
1 GEL VAGINAL DAILY
Qty: 70 G | Refills: 0 | Status: SHIPPED | OUTPATIENT
Start: 2024-06-07 | End: 2024-06-12

## 2024-06-07 RX ORDER — FLUCONAZOLE 150 MG/1
150 TABLET ORAL
Qty: 3 TABLET | Refills: 0 | OUTPATIENT
Start: 2024-06-07 | End: 2024-06-14

## 2024-06-07 RX ORDER — DOXYCYCLINE HYCLATE 100 MG
100 TABLET ORAL 2 TIMES DAILY
Qty: 14 TABLET | Refills: 0 | Status: SHIPPED | OUTPATIENT
Start: 2024-06-07 | End: 2024-06-14

## 2024-06-07 NOTE — RESULT ENCOUNTER NOTE
Patient returned call and notified. Voiced understanding. Patient would also like a Rx for 2 diflucan for possible yeast infection.

## 2024-06-10 ENCOUNTER — HOSPITAL ENCOUNTER (OUTPATIENT)
Age: 44
Discharge: HOME | End: 2024-06-10
Payer: COMMERCIAL

## 2024-06-10 DIAGNOSIS — N93.9 ABNORMAL UTERINE BLEEDING (AUB): ICD-10-CM

## 2024-06-10 DIAGNOSIS — D25.9 UTERINE LEIOMYOMA, UNSPECIFIED LOCATION: ICD-10-CM

## 2024-06-10 DIAGNOSIS — R10.2 PELVIC PAIN IN FEMALE: ICD-10-CM

## 2024-06-10 DIAGNOSIS — Z20.2 STD EXPOSURE: ICD-10-CM

## 2024-06-10 LAB
ERYTHROCYTE [DISTWIDTH] IN BLOOD BY AUTOMATED COUNT: 13.2 % (ref 11.5–15)
HCT VFR BLD AUTO: 37.6 % (ref 34–48)
HGB BLD-MCNC: 12.9 G/DL (ref 11.5–15.5)
MCH RBC QN AUTO: 30.2 PG (ref 26–35)
MCHC RBC AUTO-ENTMCNC: 34.3 G/DL (ref 32–34.5)
MCV RBC AUTO: 88.1 FL (ref 80–99.9)
PLATELET # BLD AUTO: 261 K/UL (ref 130–450)
PMV BLD AUTO: 10 FL (ref 7–12)
RBC # BLD AUTO: 4.27 M/UL (ref 3.5–5.5)
WBC OTHER # BLD: 6 K/UL (ref 4.5–11.5)

## 2024-06-10 PROCEDURE — 36415 COLL VENOUS BLD VENIPUNCTURE: CPT

## 2024-06-10 PROCEDURE — 86803 HEPATITIS C AB TEST: CPT

## 2024-06-10 PROCEDURE — 86592 SYPHILIS TEST NON-TREP QUAL: CPT

## 2024-06-10 PROCEDURE — 87389 HIV-1 AG W/HIV-1&-2 AB AG IA: CPT

## 2024-06-10 PROCEDURE — 87340 HEPATITIS B SURFACE AG IA: CPT

## 2024-06-10 PROCEDURE — 85027 COMPLETE CBC AUTOMATED: CPT

## 2024-06-11 LAB
HBV SURFACE AG SERPL QL IA: NONREACTIVE
HCV AB SERPL QL IA: NONREACTIVE
HIV 1+2 AB+HIV1 P24 AG SERPL QL IA: NONREACTIVE
RPR SER QL: NONREACTIVE

## 2024-08-25 ENCOUNTER — HOSPITAL ENCOUNTER (EMERGENCY)
Age: 44
Discharge: HOME | End: 2024-08-25
Attending: STUDENT IN AN ORGANIZED HEALTH CARE EDUCATION/TRAINING PROGRAM
Payer: COMMERCIAL

## 2024-08-25 VITALS
DIASTOLIC BLOOD PRESSURE: 77 MMHG | SYSTOLIC BLOOD PRESSURE: 119 MMHG | OXYGEN SATURATION: 98 % | TEMPERATURE: 98.1 F | RESPIRATION RATE: 16 BRPM | HEART RATE: 64 BPM

## 2024-08-25 DIAGNOSIS — K08.89 PAIN, DENTAL: Primary | ICD-10-CM

## 2024-08-25 PROCEDURE — 6370000000 HC RX 637 (ALT 250 FOR IP): Performed by: STUDENT IN AN ORGANIZED HEALTH CARE EDUCATION/TRAINING PROGRAM

## 2024-08-25 PROCEDURE — 99283 EMERGENCY DEPT VISIT LOW MDM: CPT

## 2024-08-25 RX ORDER — HYDROCODONE BITARTRATE AND ACETAMINOPHEN 5; 325 MG/1; MG/1
1 TABLET ORAL EVERY 6 HOURS PRN
Qty: 6 TABLET | Refills: 0 | Status: SHIPPED | OUTPATIENT
Start: 2024-08-25 | End: 2024-08-28

## 2024-08-25 RX ORDER — HYDROCODONE BITARTRATE AND ACETAMINOPHEN 5; 325 MG/1; MG/1
1 TABLET ORAL ONCE
Status: COMPLETED | OUTPATIENT
Start: 2024-08-25 | End: 2024-08-25

## 2024-08-25 RX ADMIN — HYDROCODONE BITARTRATE AND ACETAMINOPHEN 1 TABLET: 5; 325 TABLET ORAL at 02:51

## 2024-08-25 ASSESSMENT — LIFESTYLE VARIABLES
HOW OFTEN DO YOU HAVE A DRINK CONTAINING ALCOHOL: NEVER
HOW MANY STANDARD DRINKS CONTAINING ALCOHOL DO YOU HAVE ON A TYPICAL DAY: PATIENT DOES NOT DRINK

## 2024-08-25 NOTE — ED PROVIDER NOTES
HPI   This is a 43-year-old female patient presents to emergency department for evaluation of left upper dental pain.  On 8/20 patient had 2 teeth pulled by her dentist.  She states that she has been on amoxicillin and taking over-the-counter ibuprofen and feels like her pain is now worsening and she is having some pain more medial in the hard palate.  She denies any fevers or chills.  No difficulty breathing.  Review of Systems   Constitutional:  Negative for chills and fever.   HENT:  Negative for congestion.         Dental pain   Respiratory:  Negative for cough and shortness of breath.    Cardiovascular:  Negative for chest pain.   Gastrointestinal:  Negative for abdominal pain, diarrhea, nausea and vomiting.   Genitourinary:  Negative for difficulty urinating, dysuria and hematuria.   All other systems reviewed and are negative.       Physical Exam  Vitals and nursing note reviewed.   Constitutional:       General: She is not in acute distress.  HENT:      Head: Normocephalic.      Nose: Nose normal. No congestion.      Mouth/Throat:      Mouth: Mucous membranes are moist.      Pharynx: Oropharynx is clear.      Comments: Patient had 2 left posterior teeth pulled, no signs of infection although she has some tenderness medially in the hard palate.  No drainage.  No difficulty breathing.  No trismus stridor or drooling.  Eyes:      Conjunctiva/sclera: Conjunctivae normal.   Cardiovascular:      Rate and Rhythm: Normal rate.   Pulmonary:      Effort: Pulmonary effort is normal.   Abdominal:      General: There is no distension.   Musculoskeletal:      Cervical back: Neck supple.   Skin:     General: Skin is warm.      Capillary Refill: Capillary refill takes less than 2 seconds.   Neurological:      General: No focal deficit present.      Mental Status: She is alert.          Procedures     MDM  Patient with increased pain after she had 2 teeth pulled last week, she is now having some tenderness to palpation in

## 2024-08-28 ENCOUNTER — TELEPHONE (OUTPATIENT)
Dept: OBGYN | Age: 44
End: 2024-08-28

## 2024-08-28 NOTE — TELEPHONE ENCOUNTER
Patient called in stating that she would like Diflucan called in due to her being on 2 different antibiotics after having a tooth pulled.

## 2024-08-29 NOTE — TELEPHONE ENCOUNTER
Patient called office again regarding having Diflucan called in for antibiotics she was prescribed after having a tooth pulled.  Patient was advised to contact doctor who prescribed antibiotics or PCP for prescription.  Patient voiced understanding.

## 2024-09-11 ENCOUNTER — APPOINTMENT (OUTPATIENT)
Dept: CT IMAGING | Age: 44
End: 2024-09-11
Payer: COMMERCIAL

## 2024-09-11 ENCOUNTER — HOSPITAL ENCOUNTER (EMERGENCY)
Age: 44
Discharge: HOME | End: 2024-09-11
Attending: EMERGENCY MEDICINE
Payer: COMMERCIAL

## 2024-09-11 VITALS
HEART RATE: 74 BPM | WEIGHT: 165 LBS | TEMPERATURE: 98.4 F | RESPIRATION RATE: 14 BRPM | BODY MASS INDEX: 33.26 KG/M2 | SYSTOLIC BLOOD PRESSURE: 102 MMHG | OXYGEN SATURATION: 99 % | DIASTOLIC BLOOD PRESSURE: 70 MMHG | HEIGHT: 59 IN

## 2024-09-11 DIAGNOSIS — R51.9 ACUTE NONINTRACTABLE HEADACHE, UNSPECIFIED HEADACHE TYPE: Primary | ICD-10-CM

## 2024-09-11 PROCEDURE — 96374 THER/PROPH/DIAG INJ IV PUSH: CPT

## 2024-09-11 PROCEDURE — 2580000003 HC RX 258: Performed by: EMERGENCY MEDICINE

## 2024-09-11 PROCEDURE — 96375 TX/PRO/DX INJ NEW DRUG ADDON: CPT

## 2024-09-11 PROCEDURE — 6370000000 HC RX 637 (ALT 250 FOR IP): Performed by: EMERGENCY MEDICINE

## 2024-09-11 PROCEDURE — 70450 CT HEAD/BRAIN W/O DYE: CPT

## 2024-09-11 PROCEDURE — 96372 THER/PROPH/DIAG INJ SC/IM: CPT

## 2024-09-11 PROCEDURE — 99284 EMERGENCY DEPT VISIT MOD MDM: CPT

## 2024-09-11 PROCEDURE — 6360000002 HC RX W HCPCS: Performed by: EMERGENCY MEDICINE

## 2024-09-11 RX ORDER — METOCLOPRAMIDE HYDROCHLORIDE 5 MG/ML
10 INJECTION INTRAMUSCULAR; INTRAVENOUS ONCE
Status: COMPLETED | OUTPATIENT
Start: 2024-09-11 | End: 2024-09-11

## 2024-09-11 RX ORDER — DIPHENHYDRAMINE HYDROCHLORIDE 50 MG/ML
25 INJECTION INTRAMUSCULAR; INTRAVENOUS ONCE
Status: COMPLETED | OUTPATIENT
Start: 2024-09-11 | End: 2024-09-11

## 2024-09-11 RX ORDER — CABERGOLINE 0.5 MG/1
0.25 TABLET ORAL
COMMUNITY

## 2024-09-11 RX ORDER — SUMATRIPTAN 6 MG/.5ML
6 INJECTION, SOLUTION SUBCUTANEOUS ONCE
Status: COMPLETED | OUTPATIENT
Start: 2024-09-11 | End: 2024-09-11

## 2024-09-11 RX ORDER — KETOROLAC TROMETHAMINE 30 MG/ML
30 INJECTION, SOLUTION INTRAMUSCULAR; INTRAVENOUS ONCE
Status: COMPLETED | OUTPATIENT
Start: 2024-09-11 | End: 2024-09-11

## 2024-09-11 RX ORDER — 0.9 % SODIUM CHLORIDE 0.9 %
1000 INTRAVENOUS SOLUTION INTRAVENOUS ONCE
Status: COMPLETED | OUTPATIENT
Start: 2024-09-11 | End: 2024-09-11

## 2024-09-11 RX ADMIN — SUMATRIPTAN 6 MG: 6 INJECTION SUBCUTANEOUS at 15:58

## 2024-09-11 RX ADMIN — DIPHENHYDRAMINE HYDROCHLORIDE 25 MG: 50 INJECTION INTRAMUSCULAR; INTRAVENOUS at 15:56

## 2024-09-11 RX ADMIN — METOCLOPRAMIDE 10 MG: 5 INJECTION, SOLUTION INTRAMUSCULAR; INTRAVENOUS at 15:56

## 2024-09-11 RX ADMIN — KETOROLAC TROMETHAMINE 30 MG: 30 INJECTION, SOLUTION INTRAMUSCULAR at 17:04

## 2024-09-11 RX ADMIN — SODIUM CHLORIDE 1000 ML: 9 INJECTION, SOLUTION INTRAVENOUS at 15:58

## 2024-09-11 ASSESSMENT — PAIN DESCRIPTION - DESCRIPTORS
DESCRIPTORS: ACHING;DISCOMFORT;THROBBING
DESCRIPTORS: ACHING;DISCOMFORT;DULL

## 2024-09-11 ASSESSMENT — PAIN - FUNCTIONAL ASSESSMENT: PAIN_FUNCTIONAL_ASSESSMENT: 0-10

## 2024-09-11 ASSESSMENT — PAIN DESCRIPTION - LOCATION
LOCATION: HEAD
LOCATION: HEAD

## 2024-09-11 ASSESSMENT — PAIN SCALES - GENERAL
PAINLEVEL_OUTOF10: 10
PAINLEVEL_OUTOF10: 5

## 2024-09-11 ASSESSMENT — PAIN DESCRIPTION - ORIENTATION: ORIENTATION: MID

## 2024-09-27 ENCOUNTER — HOSPITAL ENCOUNTER (OUTPATIENT)
Age: 44
Discharge: HOME | End: 2024-09-27
Payer: COMMERCIAL

## 2024-09-27 DIAGNOSIS — M94.0 COSTOCHONDRAL JUNCTION SYNDROME: ICD-10-CM

## 2024-09-27 DIAGNOSIS — R07.89 OTHER CHEST PAIN: Primary | ICD-10-CM

## 2024-09-27 DIAGNOSIS — R07.89 OTHER CHEST PAIN: ICD-10-CM

## 2024-09-27 LAB
ANION GAP SERPL CALCULATED.3IONS-SCNC: 12 MMOL/L (ref 7–16)
BUN SERPL-MCNC: 11 MG/DL (ref 6–20)
CALCIUM SERPL-MCNC: 9.4 MG/DL (ref 8.6–10.2)
CHLORIDE SERPL-SCNC: 106 MMOL/L (ref 98–107)
CO2 SERPL-SCNC: 22 MMOL/L (ref 22–29)
CREAT SERPL-MCNC: 1 MG/DL (ref 0.5–1)
GFR, ESTIMATED: 69 ML/MIN/1.73M2
GLUCOSE SERPL-MCNC: 81 MG/DL (ref 74–99)
POTASSIUM SERPL-SCNC: 4 MMOL/L (ref 3.5–5)
SODIUM SERPL-SCNC: 140 MMOL/L (ref 132–146)

## 2024-09-27 PROCEDURE — 36415 COLL VENOUS BLD VENIPUNCTURE: CPT

## 2024-09-27 PROCEDURE — 80048 BASIC METABOLIC PNL TOTAL CA: CPT

## 2024-10-01 ENCOUNTER — HOSPITAL ENCOUNTER (OUTPATIENT)
Dept: CT IMAGING | Age: 44
Discharge: HOME | End: 2024-10-03
Attending: FAMILY MEDICINE
Payer: COMMERCIAL

## 2024-10-01 VITALS
HEART RATE: 57 BPM | DIASTOLIC BLOOD PRESSURE: 58 MMHG | HEIGHT: 59 IN | BODY MASS INDEX: 33.87 KG/M2 | SYSTOLIC BLOOD PRESSURE: 97 MMHG | WEIGHT: 168 LBS | RESPIRATION RATE: 18 BRPM | OXYGEN SATURATION: 100 %

## 2024-10-01 DIAGNOSIS — R07.89 CHEST PAIN, RADIATING: ICD-10-CM

## 2024-10-01 DIAGNOSIS — M94.0 COSTOCHONDRAL JUNCTION SYNDROME: ICD-10-CM

## 2024-10-01 LAB — HCG UR QL: NEGATIVE

## 2024-10-01 PROCEDURE — 6360000004 HC RX CONTRAST MEDICATION: Performed by: RADIOLOGY

## 2024-10-01 PROCEDURE — 36415 COLL VENOUS BLD VENIPUNCTURE: CPT

## 2024-10-01 PROCEDURE — 84703 CHORIONIC GONADOTROPIN ASSAY: CPT

## 2024-10-01 PROCEDURE — 7100000011 HC PHASE II RECOVERY - ADDTL 15 MIN

## 2024-10-01 PROCEDURE — 2580000003 HC RX 258: Performed by: INTERNAL MEDICINE

## 2024-10-01 PROCEDURE — 6370000000 HC RX 637 (ALT 250 FOR IP): Performed by: INTERNAL MEDICINE

## 2024-10-01 PROCEDURE — 7100000010 HC PHASE II RECOVERY - FIRST 15 MIN

## 2024-10-01 PROCEDURE — 75574 CT ANGIO HRT W/3D IMAGE: CPT

## 2024-10-01 RX ORDER — IOPAMIDOL 755 MG/ML
60 INJECTION, SOLUTION INTRAVASCULAR
Status: COMPLETED | OUTPATIENT
Start: 2024-10-01 | End: 2024-10-01

## 2024-10-01 RX ORDER — NITROGLYCERIN 0.4 MG/1
0.4 TABLET SUBLINGUAL ONCE
Status: COMPLETED | OUTPATIENT
Start: 2024-10-01 | End: 2024-10-01

## 2024-10-01 RX ORDER — 0.9 % SODIUM CHLORIDE 0.9 %
500 INTRAVENOUS SOLUTION INTRAVENOUS ONCE
Status: COMPLETED | OUTPATIENT
Start: 2024-10-01 | End: 2024-10-01

## 2024-10-01 RX ORDER — SODIUM CHLORIDE 0.9 % (FLUSH) 0.9 %
10 SYRINGE (ML) INJECTION
Status: ACTIVE | OUTPATIENT
Start: 2024-10-01 | End: 2024-10-02

## 2024-10-01 RX ORDER — METOPROLOL TARTRATE 1 MG/ML
5 INJECTION, SOLUTION INTRAVENOUS EVERY 5 MIN PRN
Status: DISCONTINUED | OUTPATIENT
Start: 2024-10-01 | End: 2024-10-04 | Stop reason: HOSPADM

## 2024-10-01 RX ORDER — 0.9 % SODIUM CHLORIDE 0.9 %
1000 INTRAVENOUS SOLUTION INTRAVENOUS ONCE
Status: COMPLETED | OUTPATIENT
Start: 2024-10-01 | End: 2024-10-01

## 2024-10-01 RX ADMIN — IOPAMIDOL 60 ML: 755 INJECTION, SOLUTION INTRAVENOUS at 08:40

## 2024-10-01 RX ADMIN — NITROGLYCERIN 0.4 MG: 0.4 TABLET SUBLINGUAL at 08:53

## 2024-10-01 RX ADMIN — SODIUM CHLORIDE 500 ML: 9 INJECTION, SOLUTION INTRAVENOUS at 09:10

## 2024-10-01 RX ADMIN — SODIUM CHLORIDE 1000 ML: 9 INJECTION, SOLUTION INTRAVENOUS at 08:00

## 2024-10-01 ASSESSMENT — PAIN SCALES - GENERAL: PAINLEVEL_OUTOF10: 0

## 2024-10-01 NOTE — PROCEDURES
0720Patient arrived via for CTA coronary arteries. Allergies reviewed, instructions given to  include protocol for heart rate, b/p, necessary medications that will be given, IV site and proper breath hold during scan. Questions answered and expressed understanding confirmed. Placed on monitoring devices for heart rate and rhythm, B/P taken,  IV flushed / started, flushed and prn adapter attached.      0750Dr Nelson notified of patient arrival, history, test/lab results, home medications, vital signs and rhythm.  Orders received.

## 2024-10-01 NOTE — PROCEDURES
0910Pt return from coronary cta. Denies CP/discomfort. VSS.  Eating and drinking well. Continue to monitor.    0925Continues to be pain free. VSS.    0940D/C to home with instructions to follow up with ordering physician.

## 2024-11-25 ENCOUNTER — HOSPITAL ENCOUNTER (EMERGENCY)
Age: 44
Discharge: HOME | End: 2024-11-25
Attending: EMERGENCY MEDICINE
Payer: COMMERCIAL

## 2024-11-25 VITALS
OXYGEN SATURATION: 99 % | HEART RATE: 78 BPM | DIASTOLIC BLOOD PRESSURE: 82 MMHG | TEMPERATURE: 97.6 F | RESPIRATION RATE: 16 BRPM | SYSTOLIC BLOOD PRESSURE: 121 MMHG | BODY MASS INDEX: 34.13 KG/M2 | WEIGHT: 169 LBS

## 2024-11-25 DIAGNOSIS — G56.03 BILATERAL CARPAL TUNNEL SYNDROME: Primary | ICD-10-CM

## 2024-11-25 PROCEDURE — 99283 EMERGENCY DEPT VISIT LOW MDM: CPT

## 2024-11-25 PROCEDURE — 6370000000 HC RX 637 (ALT 250 FOR IP): Performed by: EMERGENCY MEDICINE

## 2024-11-25 RX ORDER — METHYLPREDNISOLONE 4 MG/1
TABLET ORAL
Qty: 1 KIT | Refills: 0 | Status: SHIPPED | OUTPATIENT
Start: 2024-11-25 | End: 2024-12-01

## 2024-11-25 RX ORDER — IBUPROFEN 800 MG/1
800 TABLET, FILM COATED ORAL ONCE
Status: COMPLETED | OUTPATIENT
Start: 2024-11-25 | End: 2024-11-25

## 2024-11-25 RX ADMIN — IBUPROFEN 800 MG: 800 TABLET, FILM COATED ORAL at 08:28

## 2024-11-25 ASSESSMENT — PAIN - FUNCTIONAL ASSESSMENT: PAIN_FUNCTIONAL_ASSESSMENT: 0-10

## 2024-11-25 ASSESSMENT — PAIN DESCRIPTION - PAIN TYPE: TYPE: ACUTE PAIN

## 2024-11-25 ASSESSMENT — PAIN SCALES - GENERAL: PAINLEVEL_OUTOF10: 6

## 2024-11-25 ASSESSMENT — PAIN DESCRIPTION - DESCRIPTORS: DESCRIPTORS: DISCOMFORT;ACHING

## 2024-11-25 ASSESSMENT — PAIN DESCRIPTION - ONSET: ONSET: SUDDEN

## 2024-11-25 ASSESSMENT — PAIN DESCRIPTION - LOCATION: LOCATION: WRIST

## 2024-11-25 ASSESSMENT — PAIN DESCRIPTION - FREQUENCY: FREQUENCY: CONTINUOUS

## 2024-11-25 ASSESSMENT — PAIN DESCRIPTION - ORIENTATION: ORIENTATION: RIGHT;LEFT

## 2024-11-25 NOTE — ED PROVIDER NOTES
[facility] EMERGENCY DEPARTMENT  EMERGENCY DEPARTMENT ENCOUNTER        Pt Name: Rose Chun  MRN: [id number]  Birthdate 1980  Date of evaluation: 2024  Provider: Tisha Naranjo DO  PCP: Goyo Watts MD  Note Started: 8:21 AM EST 24    CHIEF COMPLAINT       Chief Complaint   Patient presents with    Wrist Pain     Bilateral wrist pain.  Started with numbness and now painful.        HISTORY OF PRESENT ILLNESS: 1 or more Elements   History From: patient    Limitations to history : None    Rose Chun is a 43 y.o. female who presents with bilateral wrist pain and tingling beginning a couple days ago.  She denies direct trauma.  She states she feels like it got worse when she started her period 3 days ago and started swelling.  She does work doing repetitive hand motions as well.  The complaint has been persistent, moderate in severity, and worsened by movement.  No relief with soaking in Epsom salt.  Patient denies any other complaints at this time.        Nursing Notes were all reviewed and agreed with or any disagreements were addressed in the HPI.    REVIEW OF SYSTEMS :           Positives and Pertinent negatives as per HPI.     SURGICAL HISTORY     Past Surgical History:   Procedure Laterality Date     SECTION      x2    INDUCED   2012    OTHER SURGICAL HISTORY      cyst removed from back of head    TUBAL LIGATION      essure       CURRENTMEDICATIONS       Discharge Medication List as of 2024  8:51 AM        CONTINUE these medications which have NOT CHANGED    Details   cabergoline (DOSTINEX) 0.5 MG tablet Take 0.5 tablets by mouth Twice a WeekHistorical Med      FEROSUL 325 (65 Fe) MG tablet take 1 tablet by mouth twice a day, DAWHistorical Med      VITAMIN D PO Take by mouthHistorical Med      albuterol (PROVENTIL) (2.5 MG/3ML) 0.083% nebulizer solution Take 3 mLs by nebulization every 6 hours as needed for Wheezing

## 2025-01-14 ENCOUNTER — HOSPITAL ENCOUNTER (OUTPATIENT)
Age: 45
Discharge: HOME | End: 2025-01-14
Payer: COMMERCIAL

## 2025-01-14 LAB — PROLACTIN SERPL-MCNC: 4.18 NG/ML

## 2025-01-14 PROCEDURE — 84146 ASSAY OF PROLACTIN: CPT

## 2025-01-14 PROCEDURE — 36415 COLL VENOUS BLD VENIPUNCTURE: CPT

## 2025-03-26 ENCOUNTER — HOSPITAL ENCOUNTER (OUTPATIENT)
Dept: ULTRASOUND IMAGING | Age: 45
Discharge: HOME | End: 2025-03-28
Attending: OBSTETRICS & GYNECOLOGY
Payer: COMMERCIAL

## 2025-03-26 DIAGNOSIS — D25.9 UTERINE LEIOMYOMA, UNSPECIFIED LOCATION: ICD-10-CM

## 2025-03-26 DIAGNOSIS — N93.9 ABNORMAL UTERINE BLEEDING (AUB): ICD-10-CM

## 2025-03-26 DIAGNOSIS — R10.2 PELVIC PAIN IN FEMALE: ICD-10-CM

## 2025-03-26 PROCEDURE — 76830 TRANSVAGINAL US NON-OB: CPT

## 2025-08-13 ENCOUNTER — OFFICE VISIT (OUTPATIENT)
Dept: OBGYN | Age: 45
End: 2025-08-13
Payer: COMMERCIAL

## 2025-08-13 VITALS
DIASTOLIC BLOOD PRESSURE: 60 MMHG | BODY MASS INDEX: 34.64 KG/M2 | SYSTOLIC BLOOD PRESSURE: 117 MMHG | HEART RATE: 75 BPM | WEIGHT: 171.5 LBS

## 2025-08-13 DIAGNOSIS — R10.2 PELVIC PAIN: Primary | ICD-10-CM

## 2025-08-13 DIAGNOSIS — Z12.4 SCREENING FOR CERVICAL CANCER: ICD-10-CM

## 2025-08-13 DIAGNOSIS — Z12.31 SCREENING MAMMOGRAM, ENCOUNTER FOR: ICD-10-CM

## 2025-08-13 PROBLEM — N93.9 ABNORMAL UTERINE BLEEDING (AUB): Status: ACTIVE | Noted: 2025-08-13

## 2025-08-13 PROBLEM — N94.6 DYSMENORRHEA: Status: ACTIVE | Noted: 2025-08-13

## 2025-08-13 PROCEDURE — 1036F TOBACCO NON-USER: CPT | Performed by: OBSTETRICS & GYNECOLOGY

## 2025-08-13 PROCEDURE — G8427 DOCREV CUR MEDS BY ELIG CLIN: HCPCS | Performed by: OBSTETRICS & GYNECOLOGY

## 2025-08-13 PROCEDURE — G8417 CALC BMI ABV UP PARAM F/U: HCPCS | Performed by: OBSTETRICS & GYNECOLOGY

## 2025-08-13 PROCEDURE — 99214 OFFICE O/P EST MOD 30 MIN: CPT | Performed by: OBSTETRICS & GYNECOLOGY

## 2025-08-13 SDOH — ECONOMIC STABILITY: FOOD INSECURITY: WITHIN THE PAST 12 MONTHS, THE FOOD YOU BOUGHT JUST DIDN'T LAST AND YOU DIDN'T HAVE MONEY TO GET MORE.: NEVER TRUE

## 2025-08-13 SDOH — ECONOMIC STABILITY: FOOD INSECURITY: WITHIN THE PAST 12 MONTHS, YOU WORRIED THAT YOUR FOOD WOULD RUN OUT BEFORE YOU GOT MONEY TO BUY MORE.: NEVER TRUE

## 2025-08-14 LAB
HPV SAMPLE: NORMAL
HPV SOURCE: NORMAL
HPV, GENOTYPE 16: NORMAL
HPV, GENOTYPE 18: NORMAL
HPV, HIGH RISK OTHER: NORMAL
HPV, INTERPRETATION: NORMAL

## 2025-08-19 LAB
HPV SAMPLE: NORMAL
HPV SOURCE: NORMAL
HPV, GENOTYPE 16: NOT DETECTED
HPV, GENOTYPE 18: NOT DETECTED
HPV, HIGH RISK OTHER: NOT DETECTED
HPV, INTERPRETATION: NORMAL

## 2025-08-20 LAB — GYNECOLOGY CYTOLOGY REPORT: NORMAL
